# Patient Record
Sex: MALE | Race: WHITE | Employment: OTHER | URBAN - METROPOLITAN AREA
[De-identification: names, ages, dates, MRNs, and addresses within clinical notes are randomized per-mention and may not be internally consistent; named-entity substitution may affect disease eponyms.]

---

## 2017-07-26 ENCOUNTER — OFFICE VISIT (OUTPATIENT)
Dept: FAMILY MEDICINE CLINIC | Facility: CLINIC | Age: 67
End: 2017-07-26

## 2017-07-26 VITALS
WEIGHT: 157 LBS | DIASTOLIC BLOOD PRESSURE: 84 MMHG | RESPIRATION RATE: 16 BRPM | BODY MASS INDEX: 25.23 KG/M2 | TEMPERATURE: 98 F | HEART RATE: 72 BPM | SYSTOLIC BLOOD PRESSURE: 138 MMHG | HEIGHT: 66 IN

## 2017-07-26 DIAGNOSIS — Z00.00 MEDICARE ANNUAL WELLNESS VISIT, SUBSEQUENT: Primary | ICD-10-CM

## 2017-07-26 DIAGNOSIS — Z12.5 PROSTATE CANCER SCREENING: ICD-10-CM

## 2017-07-26 DIAGNOSIS — Z23 NEED FOR VACCINATION: ICD-10-CM

## 2017-07-26 PROCEDURE — G0438 PPPS, INITIAL VISIT: HCPCS | Performed by: FAMILY MEDICINE

## 2017-07-26 NOTE — PROGRESS NOTES
Candance Sinks. is a 74year old male.     CC:  Patient presents with:  Wellness Visit: Medicare Annual Wellness visit per pt      HPI:    Yearly PX   Last lipid: 7/16     Immunization History   Administered            Date(s) Administered     TDAP     hands, he would like a refill from me when he runs out    Vitals: /84   Pulse 72   Temp 98.2 °F (36.8 °C) (Temporal)   Resp 16   Ht 66\"   Wt 157 lb   BMI 25.34 kg/m²    Reviewed by Jackson Downing M.D.     Physical Exam:  GEN: well developed, well nouris

## 2017-08-01 ENCOUNTER — TELEPHONE (OUTPATIENT)
Dept: FAMILY MEDICINE CLINIC | Facility: CLINIC | Age: 67
End: 2017-08-01

## 2017-08-02 ENCOUNTER — NURSE ONLY (OUTPATIENT)
Dept: FAMILY MEDICINE CLINIC | Facility: CLINIC | Age: 67
End: 2017-08-02

## 2017-08-02 DIAGNOSIS — Z00.00 MEDICARE ANNUAL WELLNESS VISIT, SUBSEQUENT: ICD-10-CM

## 2017-08-02 DIAGNOSIS — Z12.5 PROSTATE CANCER SCREENING: ICD-10-CM

## 2017-08-02 LAB
CHOLEST SMN-MCNC: 282 MG/DL (ref ?–200)
COMPLEXED PSA SERPL-MCNC: 1.25 NG/ML (ref 0.01–4)
HDLC SERPL-MCNC: 82 MG/DL (ref 45–?)
HDLC SERPL: 3.44 {RATIO} (ref ?–4.97)
LDLC SERPL CALC-MCNC: 188 MG/DL (ref ?–130)
LDLC SERPL-MCNC: 12 MG/DL (ref 5–40)
NONHDLC SERPL-MCNC: 200 MG/DL (ref ?–130)
TRIGLYCERIDES: 61 MG/DL (ref ?–150)

## 2017-08-02 PROCEDURE — 80061 LIPID PANEL: CPT | Performed by: FAMILY MEDICINE

## 2017-12-15 ENCOUNTER — OFFICE VISIT (OUTPATIENT)
Dept: FAMILY MEDICINE CLINIC | Facility: CLINIC | Age: 67
End: 2017-12-15

## 2017-12-15 ENCOUNTER — HOSPITAL ENCOUNTER (OUTPATIENT)
Dept: GENERAL RADIOLOGY | Age: 67
Discharge: HOME | End: 2017-12-15
Attending: FAMILY MEDICINE
Payer: MEDICARE

## 2017-12-15 VITALS
TEMPERATURE: 98 F | SYSTOLIC BLOOD PRESSURE: 138 MMHG | BODY MASS INDEX: 25.29 KG/M2 | RESPIRATION RATE: 16 BRPM | HEART RATE: 80 BPM | DIASTOLIC BLOOD PRESSURE: 86 MMHG | HEIGHT: 67.5 IN | WEIGHT: 163 LBS

## 2017-12-15 DIAGNOSIS — E78.5 HYPERLIPIDEMIA, UNSPECIFIED HYPERLIPIDEMIA TYPE: ICD-10-CM

## 2017-12-15 DIAGNOSIS — M25.50 MULTIPLE JOINT PAIN: ICD-10-CM

## 2017-12-15 DIAGNOSIS — M25.50 MULTIPLE JOINT PAIN: Primary | ICD-10-CM

## 2017-12-15 DIAGNOSIS — R61 NIGHT SWEATS: ICD-10-CM

## 2017-12-15 DIAGNOSIS — E55.9 VITAMIN D DEFICIENCY: ICD-10-CM

## 2017-12-15 PROCEDURE — 85027 COMPLETE CBC AUTOMATED: CPT | Performed by: FAMILY MEDICINE

## 2017-12-15 PROCEDURE — 84443 ASSAY THYROID STIM HORMONE: CPT | Performed by: FAMILY MEDICINE

## 2017-12-15 PROCEDURE — 87476 LYME DIS DNA AMP PROBE: CPT | Performed by: FAMILY MEDICINE

## 2017-12-15 PROCEDURE — 99214 OFFICE O/P EST MOD 30 MIN: CPT | Performed by: FAMILY MEDICINE

## 2017-12-15 PROCEDURE — 71020 XR CHEST PA + LAT CHEST (CPT=71020): CPT | Performed by: FAMILY MEDICINE

## 2017-12-15 PROCEDURE — 86038 ANTINUCLEAR ANTIBODIES: CPT | Performed by: FAMILY MEDICINE

## 2017-12-15 PROCEDURE — 80053 COMPREHEN METABOLIC PANEL: CPT | Performed by: FAMILY MEDICINE

## 2017-12-15 PROCEDURE — 85652 RBC SED RATE AUTOMATED: CPT | Performed by: FAMILY MEDICINE

## 2017-12-15 PROCEDURE — 82306 VITAMIN D 25 HYDROXY: CPT | Performed by: FAMILY MEDICINE

## 2017-12-15 PROCEDURE — 86431 RHEUMATOID FACTOR QUANT: CPT | Performed by: FAMILY MEDICINE

## 2017-12-15 RX ORDER — MELOXICAM 15 MG/1
15 TABLET ORAL
Qty: 30 TABLET | Refills: 3 | Status: SHIPPED | OUTPATIENT
Start: 2017-12-15 | End: 2019-07-24

## 2017-12-15 NOTE — PROGRESS NOTES
Tiffani Varma. is a 69year old male. CC:  Patient presents with:  Medication Request: refill on diclofenac per pt      HPI:  He has issue with multiple joint pains for many years. It seems that his pains are worse the past 2-3 months.  Pains are l Smokeless tobacco: Never Used                      Comment: CURRENT NON SMOKER  Alcohol use: Yes           0.0 oz/week     Comment: 12 beers per week       ROS:  General: lower energy  Cardiovascular/Pulses: Denies palpitations, CHEST (CPT=71020); Future      3. Vitamin D deficiency   Await results   - VITAMIN D, 25-HYDROXY; Future    4. Hyperlipidemia, unspecified hyperlipidemia type  Await results, will likely need to go back on lipid lowering medication    - LIPID PANEL;  Future

## 2017-12-18 ENCOUNTER — TELEPHONE (OUTPATIENT)
Dept: FAMILY MEDICINE CLINIC | Facility: CLINIC | Age: 67
End: 2017-12-18

## 2017-12-19 ENCOUNTER — NURSE ONLY (OUTPATIENT)
Dept: FAMILY MEDICINE CLINIC | Facility: CLINIC | Age: 67
End: 2017-12-19

## 2017-12-19 DIAGNOSIS — E78.5 HYPERLIPIDEMIA, UNSPECIFIED HYPERLIPIDEMIA TYPE: ICD-10-CM

## 2017-12-19 PROCEDURE — 80061 LIPID PANEL: CPT | Performed by: FAMILY MEDICINE

## 2017-12-19 PROCEDURE — 36415 COLL VENOUS BLD VENIPUNCTURE: CPT | Performed by: FAMILY MEDICINE

## 2017-12-21 ENCOUNTER — TELEPHONE (OUTPATIENT)
Dept: FAMILY MEDICINE CLINIC | Facility: CLINIC | Age: 67
End: 2017-12-21

## 2017-12-21 DIAGNOSIS — E78.5 HYPERLIPIDEMIA, UNSPECIFIED HYPERLIPIDEMIA TYPE: Primary | ICD-10-CM

## 2017-12-21 NOTE — TELEPHONE ENCOUNTER
Agreed. It is a bit perplexing. The T Chol did drop dramatically as did his HDL. I am not sure how to explain this. The most likely explanation is a lab error if he did not drastically cut fats from the diet.  I suppose the best thing to do is to confirm th

## 2017-12-21 NOTE — TELEPHONE ENCOUNTER
Patient wants to discuss his most recent cholesterol results with Dr Misty Uribe. Patient states that it dropped dramatically and he is not sure why as he is not on any cholesterol medication.

## 2017-12-21 NOTE — TELEPHONE ENCOUNTER
Patient was given the information per . Patient states that he has not changed his diet drastically. He states that he believes it is all related to stress.  He is going to get thru the holidays and then about a month after that he will come in an

## 2017-12-21 NOTE — TELEPHONE ENCOUNTER
When patient was given his results yesterday. He could not understand how his Total Cholesterol could have dropped drastically in 4 months. He questioned the results numerous times last night.

## 2018-07-27 ENCOUNTER — OFFICE VISIT (OUTPATIENT)
Dept: FAMILY MEDICINE CLINIC | Facility: CLINIC | Age: 68
End: 2018-07-27
Payer: MEDICARE

## 2018-07-27 VITALS
HEART RATE: 72 BPM | TEMPERATURE: 98 F | HEIGHT: 66 IN | BODY MASS INDEX: 25.23 KG/M2 | DIASTOLIC BLOOD PRESSURE: 85 MMHG | SYSTOLIC BLOOD PRESSURE: 135 MMHG | RESPIRATION RATE: 16 BRPM | WEIGHT: 157 LBS

## 2018-07-27 DIAGNOSIS — E78.5 HYPERLIPIDEMIA, UNSPECIFIED HYPERLIPIDEMIA TYPE: ICD-10-CM

## 2018-07-27 DIAGNOSIS — Z00.00 MEDICARE ANNUAL WELLNESS VISIT, SUBSEQUENT: Primary | ICD-10-CM

## 2018-07-27 DIAGNOSIS — Z12.5 PROSTATE CANCER SCREENING: ICD-10-CM

## 2018-07-27 LAB
ALBUMIN SERPL-MCNC: 3.8 G/DL (ref 3.5–4.8)
ALBUMIN/GLOB SERPL: 1.1 {RATIO} (ref 1–2)
ALP LIVER SERPL-CCNC: 76 U/L (ref 45–117)
ALT SERPL-CCNC: 29 U/L (ref 17–63)
ANION GAP SERPL CALC-SCNC: 8 MMOL/L (ref 0–18)
AST SERPL-CCNC: 25 U/L (ref 15–41)
BILIRUB SERPL-MCNC: 0.5 MG/DL (ref 0.1–2)
BUN BLD-MCNC: 19 MG/DL (ref 8–20)
BUN/CREAT SERPL: 19 (ref 10–20)
CALCIUM BLD-MCNC: 8.9 MG/DL (ref 8.3–10.3)
CHLORIDE SERPL-SCNC: 104 MMOL/L (ref 101–111)
CHOLEST SMN-MCNC: 257 MG/DL (ref ?–200)
CO2 SERPL-SCNC: 26 MMOL/L (ref 22–32)
COMPLEXED PSA SERPL-MCNC: 1.21 NG/ML (ref 0.01–4)
CREAT BLD-MCNC: 1 MG/DL (ref 0.7–1.3)
ERYTHROCYTE [DISTWIDTH] IN BLOOD BY AUTOMATED COUNT: 13.5 % (ref 11.5–16)
GLOBULIN PLAS-MCNC: 3.6 G/DL (ref 2.5–3.7)
GLUCOSE BLD-MCNC: 82 MG/DL (ref 70–99)
HCT VFR BLD AUTO: 49.8 % (ref 37–53)
HDLC SERPL-MCNC: 80 MG/DL (ref 40–59)
HGB BLD-MCNC: 16.5 G/DL (ref 13–17)
LDLC SERPL CALC-MCNC: 164 MG/DL (ref ?–100)
M PROTEIN MFR SERPL ELPH: 7.4 G/DL (ref 6.1–8.3)
MCH RBC QN AUTO: 30.8 PG (ref 27–33.2)
MCHC RBC AUTO-ENTMCNC: 33.1 G/DL (ref 31–37)
MCV RBC AUTO: 92.9 FL (ref 80–99)
NONHDLC SERPL-MCNC: 177 MG/DL (ref ?–130)
OSMOLALITY SERPL CALC.SUM OF ELEC: 287 MOSM/KG (ref 275–295)
PLATELET # BLD AUTO: 217 10(3)UL (ref 150–450)
POTASSIUM SERPL-SCNC: 4.1 MMOL/L (ref 3.6–5.1)
RBC # BLD AUTO: 5.36 X10(6)UL (ref 3.8–5.8)
RED CELL DISTRIBUTION WIDTH-SD: 45.8 FL (ref 35.1–46.3)
SODIUM SERPL-SCNC: 138 MMOL/L (ref 136–144)
TRIGL SERPL-MCNC: 66 MG/DL (ref 30–149)
TSI SER-ACNC: 0.98 MIU/ML (ref 0.35–5.5)
VLDLC SERPL CALC-MCNC: 13 MG/DL (ref 0–30)
WBC # BLD AUTO: 6.1 X10(3) UL (ref 4–13)

## 2018-07-27 PROCEDURE — 85027 COMPLETE CBC AUTOMATED: CPT | Performed by: FAMILY MEDICINE

## 2018-07-27 PROCEDURE — 84443 ASSAY THYROID STIM HORMONE: CPT | Performed by: FAMILY MEDICINE

## 2018-07-27 PROCEDURE — 80053 COMPREHEN METABOLIC PANEL: CPT | Performed by: FAMILY MEDICINE

## 2018-07-27 PROCEDURE — 36415 COLL VENOUS BLD VENIPUNCTURE: CPT | Performed by: FAMILY MEDICINE

## 2018-07-27 PROCEDURE — G0439 PPPS, SUBSEQ VISIT: HCPCS | Performed by: FAMILY MEDICINE

## 2018-07-27 PROCEDURE — 80061 LIPID PANEL: CPT | Performed by: FAMILY MEDICINE

## 2018-07-27 NOTE — PROGRESS NOTES
Donny Hauser. is a 69year old male. CC:  No chief complaint on file.       HPI:  Yearly PX  Last lipid: 12/17  Last PSA: 8/17    Component      Latest Ref Rng & Units 12/19/2017 8/2/2017   CHOLESTEROL, TOTAL      <200 mg/dL 206 (H)    Triglycerid No    Difficulty dressing or bathing?: No    Problems with daily activities? : No    Memory Problems?: No      Fall/Risk Assessment          Have you fallen in the last 12 months?: 0-No                                        Fall/Risk Scorin          D Relation Status Comments   Fa  at age 69 CVA   Mo  at age 64 DEMENTIA      Smoking status: Never Smoker                                                              Smokeless tobacco: Never Used                      Comment: CURRENT Future  - TSH W REFLEX TO FREE T4; Future  - LIPID PANEL; Future  - PSA SCREEN; Future  - VENIPUNCTURE    2. Hyperlipidemia, unspecified hyperlipidemia type  Await results   - LIPID PANEL; Future  - VENIPUNCTURE    3.  Prostate cancer screening  Await resul

## 2018-12-30 ENCOUNTER — HOSPITAL ENCOUNTER (OUTPATIENT)
Age: 68
Discharge: HOME | End: 2018-12-30
Payer: MEDICARE

## 2018-12-30 VITALS
WEIGHT: 158 LBS | BODY MASS INDEX: 26 KG/M2 | RESPIRATION RATE: 16 BRPM | DIASTOLIC BLOOD PRESSURE: 88 MMHG | HEART RATE: 75 BPM | SYSTOLIC BLOOD PRESSURE: 135 MMHG | TEMPERATURE: 100 F

## 2018-12-30 DIAGNOSIS — B02.9 HERPES ZOSTER WITHOUT COMPLICATION: Primary | ICD-10-CM

## 2018-12-30 PROCEDURE — 99204 OFFICE O/P NEW MOD 45 MIN: CPT | Performed by: NURSE PRACTITIONER

## 2018-12-30 PROCEDURE — 99213 OFFICE O/P EST LOW 20 MIN: CPT

## 2018-12-30 PROCEDURE — 99204 OFFICE O/P NEW MOD 45 MIN: CPT

## 2018-12-30 RX ORDER — NAPROXEN 500 MG/1
500 TABLET ORAL 2 TIMES DAILY WITH MEALS
COMMUNITY
End: 2019-07-24

## 2018-12-30 RX ORDER — HYDROCODONE BITARTRATE AND ACETAMINOPHEN 5; 325 MG/1; MG/1
1 TABLET ORAL EVERY 6 HOURS PRN
Qty: 10 TABLET | Refills: 0 | Status: SHIPPED | OUTPATIENT
Start: 2018-12-30 | End: 2019-01-07

## 2018-12-30 RX ORDER — VALACYCLOVIR HYDROCHLORIDE 1 G/1
1 TABLET, FILM COATED ORAL 3 TIMES DAILY
Qty: 21 TABLET | Refills: 0 | Status: SHIPPED | OUTPATIENT
Start: 2018-12-30 | End: 2019-01-06

## 2018-12-30 RX ORDER — METHYLPREDNISOLONE 4 MG/1
TABLET ORAL
Qty: 1 PACKAGE | Refills: 0 | Status: SHIPPED | OUTPATIENT
Start: 2018-12-30 | End: 2019-01-07

## 2018-12-30 NOTE — ED PROVIDER NOTES
Patient Seen in: [address] [facility] [place]    History   Patient presents with:  Rash Skin Problem (integumentary)    Stated Complaint: Neck rash x 1days    38-year-old male presents today with a rash to the right side of the neck extending down to Resp 16   Temp 99.8 °F (37.7 °C)   Temp src Temporal   SpO2    O2 Device        Current:/88   Pulse 75   Temp 99.8 °F (37.7 °C) (Temporal)   Resp 16   Wt 71.7 kg   BMI 25.50 kg/m²         Physical Exam   Constitutional: He appears well-developed an Oral Tab  Take 1 tablet (1,000 mg total) by mouth 3 (three) times daily for 7 days.   Qty: 21 tablet Refills: 0    methylPREDNISolone (MEDROL) 4 MG Oral Tablet Therapy Pack  Dosepack: take as directed  Qty: 1 Package Refills: 0    HYDROcodone-acetaminophen

## 2018-12-30 NOTE — ED INITIAL ASSESSMENT (HPI)
Rash on right side of his neck, spreading to right upper chest, no shortness of breath, + itching, rash occurred after new shampoo, rash started with a fine line around his neck before spreading.

## 2019-01-07 ENCOUNTER — OFFICE VISIT (OUTPATIENT)
Dept: FAMILY MEDICINE CLINIC | Facility: CLINIC | Age: 69
End: 2019-01-07
Payer: MEDICARE

## 2019-01-07 VITALS
BODY MASS INDEX: 25 KG/M2 | TEMPERATURE: 98 F | RESPIRATION RATE: 14 BRPM | SYSTOLIC BLOOD PRESSURE: 120 MMHG | HEART RATE: 70 BPM | DIASTOLIC BLOOD PRESSURE: 84 MMHG | WEIGHT: 154.38 LBS

## 2019-01-07 DIAGNOSIS — B02.9 HERPES ZOSTER WITHOUT COMPLICATION: Primary | ICD-10-CM

## 2019-01-07 PROCEDURE — 99213 OFFICE O/P EST LOW 20 MIN: CPT | Performed by: FAMILY MEDICINE

## 2019-01-07 NOTE — PROGRESS NOTES
Sheryle Familia. is a 77year old male. CC:  Patient presents with:  Shingles      HPI:  F/u UC visit 12/30/18 for shingles. He has taken Valtrex and Prednisone as directed. He did not need the Norco that was prescribed.  He did note pain in the 2nd energy level stable  GI: Denies abdominal pain    Vitals: /84   Pulse 70   Temp 98.3 °F (36.8 °C) (Temporal)   Resp 14   Wt 154 lb 6.4 oz   BMI 24.92 kg/m²    Reviewed by Kendall Yeung M.D.     Physical Exam:  GEN: well developed, well nourished, in no

## 2019-07-22 ENCOUNTER — TELEPHONE (OUTPATIENT)
Dept: FAMILY MEDICINE CLINIC | Facility: CLINIC | Age: 69
End: 2019-07-22

## 2019-07-22 NOTE — TELEPHONE ENCOUNTER
Received OV notes from ROGER Hough. Reviewed by Dr Linda Santo. Patient also brought in written note from Dr Johnny Leonard advising patient to see PCP for Medrol dose pack. Per Dr Linda Santo patient needs an appt first.     Patient advised. Verbalized understanding.

## 2019-07-24 ENCOUNTER — OFFICE VISIT (OUTPATIENT)
Dept: FAMILY MEDICINE CLINIC | Facility: CLINIC | Age: 69
End: 2019-07-24
Payer: MEDICARE

## 2019-07-24 VITALS
SYSTOLIC BLOOD PRESSURE: 142 MMHG | HEIGHT: 66.25 IN | DIASTOLIC BLOOD PRESSURE: 92 MMHG | RESPIRATION RATE: 14 BRPM | BODY MASS INDEX: 25.39 KG/M2 | TEMPERATURE: 98 F | WEIGHT: 158 LBS | OXYGEN SATURATION: 98 % | HEART RATE: 80 BPM

## 2019-07-24 DIAGNOSIS — M54.10 RADICULAR PAIN OF LEFT LOWER EXTREMITY: Primary | ICD-10-CM

## 2019-07-24 PROCEDURE — 99214 OFFICE O/P EST MOD 30 MIN: CPT | Performed by: FAMILY MEDICINE

## 2019-07-24 RX ORDER — PREDNISONE 20 MG/1
TABLET ORAL
Qty: 18 TABLET | Refills: 0 | Status: SHIPPED | OUTPATIENT
Start: 2019-07-24 | End: 2019-08-02

## 2019-07-24 NOTE — PROGRESS NOTES
Serena Rosas. is a 77year old male.     CC:  Patient presents with:  Leg Pain: per pt- from left side down to leg      HPI:  Chief Complaint: Left buttock Pain  Duration:  3 Week(s)  Severity: severe  Cause/ Description of Injury: started after sitt lymphadenopathy, thyromegaly or masses  CAR: S1, S2 normal, RRR; no S3, no S4; no click; murmur negative  PULM: clear to auscultation B, no accessory muscle use  GI: not examined  PSYCH: alert and oriented x 3; affect appropriate  SKIN: no rashes, no suspi

## 2019-07-29 ENCOUNTER — TELEPHONE (OUTPATIENT)
Dept: FAMILY MEDICINE CLINIC | Facility: CLINIC | Age: 69
End: 2019-07-29

## 2019-07-29 NOTE — TELEPHONE ENCOUNTER
DR BROWN CALLED AND WANTED TO UPDATE DR MANIRQUE WITH SOME INFO BEFORE PT WAS SEEN.       PLEASE CALL CELL [phone]    KIRK MATHEW OUT OF OFFICE UNTIL TOMORROW  V/U    THANK YOU

## 2019-07-29 NOTE — TELEPHONE ENCOUNTER
Routing to PCP.  Patient is being seen tomorrow at [address]   Date Time Provider Sandro Guevara   7/30/2019  9:00 AM Ty Simmons MD [facility] [place] Ferrera

## 2019-07-30 ENCOUNTER — OFFICE VISIT (OUTPATIENT)
Dept: FAMILY MEDICINE CLINIC | Facility: CLINIC | Age: 69
End: 2019-07-30
Payer: MEDICARE

## 2019-07-30 VITALS
BODY MASS INDEX: 24.62 KG/M2 | RESPIRATION RATE: 16 BRPM | SYSTOLIC BLOOD PRESSURE: 142 MMHG | WEIGHT: 155 LBS | DIASTOLIC BLOOD PRESSURE: 90 MMHG | TEMPERATURE: 99 F | HEART RATE: 76 BPM | HEIGHT: 66.5 IN

## 2019-07-30 DIAGNOSIS — Z12.5 PROSTATE CANCER SCREENING: ICD-10-CM

## 2019-07-30 DIAGNOSIS — M54.10 RADICULAR PAIN OF LEFT LOWER EXTREMITY: ICD-10-CM

## 2019-07-30 DIAGNOSIS — E78.5 HYPERLIPIDEMIA, UNSPECIFIED HYPERLIPIDEMIA TYPE: ICD-10-CM

## 2019-07-30 DIAGNOSIS — R03.0 ELEVATED BLOOD PRESSURE READING: ICD-10-CM

## 2019-07-30 DIAGNOSIS — Z00.00 MEDICARE ANNUAL WELLNESS VISIT, SUBSEQUENT: Primary | ICD-10-CM

## 2019-07-30 LAB
ALBUMIN SERPL-MCNC: 3.9 G/DL (ref 3.4–5)
ALBUMIN/GLOB SERPL: 1 {RATIO} (ref 1–2)
ALP LIVER SERPL-CCNC: 74 U/L (ref 45–117)
ALT SERPL-CCNC: 28 U/L (ref 16–61)
ANION GAP SERPL CALC-SCNC: 3 MMOL/L (ref 0–18)
AST SERPL-CCNC: 23 U/L (ref 15–37)
BILIRUB SERPL-MCNC: 0.7 MG/DL (ref 0.1–2)
BUN BLD-MCNC: 21 MG/DL (ref 7–18)
BUN/CREAT SERPL: 18.6 (ref 10–20)
CALCIUM BLD-MCNC: 9.9 MG/DL (ref 8.5–10.1)
CHLORIDE SERPL-SCNC: 101 MMOL/L (ref 98–112)
CHOLEST SMN-MCNC: 256 MG/DL (ref ?–200)
CO2 SERPL-SCNC: 33 MMOL/L (ref 21–32)
COMPLEXED PSA SERPL-MCNC: 5.18 NG/ML (ref ?–4)
CREAT BLD-MCNC: 1.13 MG/DL (ref 0.7–1.3)
DEPRECATED RDW RBC AUTO: 49.3 FL (ref 35.1–46.3)
ERYTHROCYTE [DISTWIDTH] IN BLOOD BY AUTOMATED COUNT: 14.8 % (ref 11–15)
GLOBULIN PLAS-MCNC: 4.1 G/DL (ref 2.8–4.4)
GLUCOSE BLD-MCNC: 89 MG/DL (ref 70–99)
HCT VFR BLD AUTO: 53.1 % (ref 39–53)
HDLC SERPL-MCNC: 91 MG/DL (ref 40–59)
HGB BLD-MCNC: 17.3 G/DL (ref 13–17.5)
LDLC SERPL CALC-MCNC: 139 MG/DL (ref ?–100)
M PROTEIN MFR SERPL ELPH: 8 G/DL (ref 6.4–8.2)
MCH RBC QN AUTO: 30.1 PG (ref 26–34)
MCHC RBC AUTO-ENTMCNC: 32.6 G/DL (ref 31–37)
MCV RBC AUTO: 92.3 FL (ref 80–100)
NONHDLC SERPL-MCNC: 165 MG/DL (ref ?–130)
OSMOLALITY SERPL CALC.SUM OF ELEC: 286 MOSM/KG (ref 275–295)
PLATELET # BLD AUTO: 259 10(3)UL (ref 150–450)
POTASSIUM SERPL-SCNC: 4 MMOL/L (ref 3.5–5.1)
RBC # BLD AUTO: 5.75 X10(6)UL (ref 3.8–5.8)
SODIUM SERPL-SCNC: 137 MMOL/L (ref 136–145)
TRIGL SERPL-MCNC: 129 MG/DL (ref 30–149)
TSI SER-ACNC: 1.17 MIU/ML (ref 0.36–3.74)
VLDLC SERPL CALC-MCNC: 26 MG/DL (ref 0–30)
WBC # BLD AUTO: 9.4 X10(3) UL (ref 4–11)

## 2019-07-30 PROCEDURE — 85027 COMPLETE CBC AUTOMATED: CPT | Performed by: FAMILY MEDICINE

## 2019-07-30 PROCEDURE — G0439 PPPS, SUBSEQ VISIT: HCPCS | Performed by: FAMILY MEDICINE

## 2019-07-30 PROCEDURE — 80053 COMPREHEN METABOLIC PANEL: CPT | Performed by: FAMILY MEDICINE

## 2019-07-30 PROCEDURE — 84443 ASSAY THYROID STIM HORMONE: CPT | Performed by: FAMILY MEDICINE

## 2019-07-30 PROCEDURE — 80061 LIPID PANEL: CPT | Performed by: FAMILY MEDICINE

## 2019-07-30 RX ORDER — ACETAMINOPHEN AND CODEINE PHOSPHATE 300; 30 MG/1; MG/1
1 TABLET ORAL EVERY 4 HOURS PRN
Qty: 30 TABLET | Refills: 0 | Status: SHIPPED
Start: 2019-07-30 | End: 2019-08-07

## 2019-07-30 NOTE — PROGRESS NOTES
Zara Maryland. is a 77year old male.     CC:  Patient presents with:  Wellness Visit: Medicare Wellness Visit per pt      HPI:  Yearly PX  Last lipid:  Lab Results   Component Value Date    CHOLEST 257 (H) 07/27/2018    TRIG 66 07/27/2018    HDL 80 ( Able without help    Preparing your meals: Able without help    Managing money/bills: Able without help    Taking medications as prescribed: Able without help    Are you able to afford your medications?: Yes    Hearing Problems?: No     Functional Status PUILMONARY NODULE   • Nephrolithiasis 10/02/2009      Past Surgical History:   Procedure Laterality Date   • INGUINAL HERNIORRHAPHY      BILAT W/REVISIONS      Family History   Problem Relation Age of Onset   • Other (PSYCHIATRIC PROBLEMS [Other]) Mother greater trochanter. NEURO: slight decrease in the R Achilles DTR, + L SLR    ASSESSMENT AND PLAN    1.  Medicare annual wellness visit, subsequent  Colonoscopy in 2024  He defers Pneumonia vaccines  He will get Shingles vaccine at local pharmacy  Await res needed for Pain. No follow-ups on file.       Authorized by Eb Paez M.D.

## 2019-08-03 ENCOUNTER — HOSPITAL ENCOUNTER (OUTPATIENT)
Dept: MRI IMAGING | Age: 69
Discharge: HOME | End: 2019-08-03
Attending: FAMILY MEDICINE
Payer: MEDICARE

## 2019-08-03 DIAGNOSIS — M54.10 RADICULAR PAIN OF LEFT LOWER EXTREMITY: ICD-10-CM

## 2019-08-03 PROCEDURE — 72148 MRI LUMBAR SPINE W/O DYE: CPT | Performed by: FAMILY MEDICINE

## 2019-08-06 ENCOUNTER — HOSPITAL ENCOUNTER (OUTPATIENT)
Dept: GENERAL RADIOLOGY | Age: 69
Discharge: HOME | End: 2019-08-06
Attending: FAMILY MEDICINE
Payer: MEDICARE

## 2019-08-06 DIAGNOSIS — R97.20 ELEVATED PSA: ICD-10-CM

## 2019-08-06 DIAGNOSIS — M25.552 LEFT HIP PAIN: ICD-10-CM

## 2019-08-06 DIAGNOSIS — M25.552 LEFT HIP PAIN: Primary | ICD-10-CM

## 2019-08-06 PROCEDURE — 73502 X-RAY EXAM HIP UNI 2-3 VIEWS: CPT | Performed by: FAMILY MEDICINE

## 2019-08-07 ENCOUNTER — TELEPHONE (OUTPATIENT)
Dept: FAMILY MEDICINE CLINIC | Facility: CLINIC | Age: 69
End: 2019-08-07

## 2019-08-07 RX ORDER — ACETAMINOPHEN AND CODEINE PHOSPHATE 300; 30 MG/1; MG/1
1 TABLET ORAL EVERY 4 HOURS PRN
Qty: 30 TABLET | Refills: 0 | Status: SHIPPED | OUTPATIENT
Start: 2019-08-07 | End: 2019-09-17

## 2019-08-07 RX ORDER — CYCLOBENZAPRINE HCL 10 MG
10 TABLET ORAL 3 TIMES DAILY PRN
Qty: 30 TABLET | Refills: 0 | Status: SHIPPED | OUTPATIENT
Start: 2019-08-07 | End: 2019-08-27

## 2019-08-07 NOTE — TELEPHONE ENCOUNTER
Patient advised of the information per .  [email or web address]

## 2019-08-07 NOTE — TELEPHONE ENCOUNTER
Please let patient or caregiver know or leave message when the T3 has been sent. I also sent Flexeril to his pharmacy. If that does not help then we need to see the Ortho Spine specialist.    The prostate blood elevation is not caused by the hip issue.

## 2019-08-07 NOTE — TELEPHONE ENCOUNTER
----- Message from Jemima Stauffer MD sent at 8/7/2019  7:31 AM CDT -----  Please let patient or caregiver know or leave message that his L hip xray shows mild arthritic change. The xray was able to look at his entire pelvis as well as the R hip.  There are

## 2019-08-07 NOTE — TELEPHONE ENCOUNTER
Patient advised of the x-ray results. Patient is wonderin. Can he have a refill on his Tylenol with Codeine? It is helping with the pain. 2. Do you think a muscle relaxer would help? 3. Is the PSA up do the hip issues?

## 2019-08-30 ENCOUNTER — TELEPHONE (OUTPATIENT)
Dept: FAMILY MEDICINE CLINIC | Facility: CLINIC | Age: 69
End: 2019-08-30

## 2019-08-30 RX ORDER — PANTOPRAZOLE SODIUM 40 MG/1
40 TABLET, DELAYED RELEASE ORAL
Qty: 30 TABLET | Refills: 0 | Status: SHIPPED | OUTPATIENT
Start: 2019-08-30 | End: 2019-09-19

## 2019-08-30 NOTE — TELEPHONE ENCOUNTER
Patient advised of the information per . Appointment made. Prescription sent to the pharmacy.    Patient will get the information for the GI specialist when he is in the office on Saturday

## 2019-08-30 NOTE — TELEPHONE ENCOUNTER
I do. Can I see him tomorrow? I see that he has been given recent scripts for Amoxil and Motrin----I think from his Dentist?? The black tar like stools is concerning for a GI bleed--usually from the upper GI tract, like the stomach.  The Motrin type produ

## 2019-08-30 NOTE — TELEPHONE ENCOUNTER
Pt said for the 5th day in a row his Bowel movements are the color of tar. No stomach pains, no vomiting, no nausea. No trauma. Pt does not feel bad of any sorts. He wants to know if TJ wants to see him.    Please return call to [phone]

## 2019-08-31 ENCOUNTER — OFFICE VISIT (OUTPATIENT)
Dept: FAMILY MEDICINE CLINIC | Facility: CLINIC | Age: 69
End: 2019-08-31
Payer: MEDICARE

## 2019-08-31 VITALS
WEIGHT: 157.13 LBS | TEMPERATURE: 99 F | SYSTOLIC BLOOD PRESSURE: 136 MMHG | DIASTOLIC BLOOD PRESSURE: 72 MMHG | BODY MASS INDEX: 25 KG/M2 | HEART RATE: 84 BPM | OXYGEN SATURATION: 98 % | RESPIRATION RATE: 16 BRPM

## 2019-08-31 DIAGNOSIS — K92.1 MELENA: Primary | ICD-10-CM

## 2019-08-31 DIAGNOSIS — R97.20 ELEVATED PSA: ICD-10-CM

## 2019-08-31 LAB
DEPRECATED RDW RBC AUTO: 48.4 FL (ref 35.1–46.3)
ERYTHROCYTE [DISTWIDTH] IN BLOOD BY AUTOMATED COUNT: 14.4 % (ref 11–15)
HCT VFR BLD AUTO: 36.2 % (ref 39–53)
HGB BLD-MCNC: 11.9 G/DL (ref 13–17.5)
MCH RBC QN AUTO: 30.6 PG (ref 26–34)
MCHC RBC AUTO-ENTMCNC: 32.9 G/DL (ref 31–37)
MCV RBC AUTO: 93.1 FL (ref 80–100)
PLATELET # BLD AUTO: 229 10(3)UL (ref 150–450)
PSA SERPL-MCNC: 1.87 NG/ML (ref ?–4)
RBC # BLD AUTO: 3.89 X10(6)UL (ref 3.8–5.8)
WBC # BLD AUTO: 8.6 X10(3) UL (ref 4–11)

## 2019-08-31 PROCEDURE — 84153 ASSAY OF PSA TOTAL: CPT | Performed by: FAMILY MEDICINE

## 2019-08-31 PROCEDURE — 99214 OFFICE O/P EST MOD 30 MIN: CPT | Performed by: FAMILY MEDICINE

## 2019-08-31 PROCEDURE — 85027 COMPLETE CBC AUTOMATED: CPT | Performed by: FAMILY MEDICINE

## 2019-08-31 NOTE — PROGRESS NOTES
Tiffani Varma. is a 77year old male. CC:  Patient presents with: Other: black stools for one week per patient       HPI:  Has note black stools for about one week. No abd pain. No NSAIDs but has been taking ASA daily for hip pain. No wt loss.  He (36.9 °C) (Temporal)   Resp 16   Wt 157 lb 2 oz   SpO2 98%   BMI 24.98 kg/m²    Reviewed by Maryuri Dyer M.D.     Physical Exam:  GEN: well developed, well nourished, in no apparent distress  EYE: B conjunctiva and lids normal  HENT: normocephalic; normal no see ambulance record

## 2019-09-10 PROBLEM — F10.10 ALCOHOL ABUSE: Status: ACTIVE | Noted: 2019-09-10

## 2019-09-10 PROBLEM — K92.1 BLACK TARRY STOOLS: Status: ACTIVE | Noted: 2019-09-10

## 2019-09-10 PROBLEM — K21.9 GERD WITHOUT ESOPHAGITIS: Status: ACTIVE | Noted: 2019-09-10

## 2019-09-10 PROCEDURE — 86706 HEP B SURFACE ANTIBODY: CPT | Performed by: INTERNAL MEDICINE

## 2019-09-10 PROCEDURE — 86708 HEPATITIS A ANTIBODY: CPT | Performed by: INTERNAL MEDICINE

## 2019-09-10 PROCEDURE — 86704 HEP B CORE ANTIBODY TOTAL: CPT | Performed by: INTERNAL MEDICINE

## 2019-09-10 PROCEDURE — 80074 ACUTE HEPATITIS PANEL: CPT | Performed by: INTERNAL MEDICINE

## 2019-09-10 PROCEDURE — 87340 HEPATITIS B SURFACE AG IA: CPT | Performed by: INTERNAL MEDICINE

## 2019-09-19 PROCEDURE — 88305 TISSUE EXAM BY PATHOLOGIST: CPT | Performed by: INTERNAL MEDICINE

## 2019-12-17 ENCOUNTER — TELEPHONE (OUTPATIENT)
Dept: FAMILY MEDICINE CLINIC | Facility: CLINIC | Age: 69
End: 2019-12-17

## 2019-12-17 ENCOUNTER — NURSE ONLY (OUTPATIENT)
Dept: FAMILY MEDICINE CLINIC | Facility: CLINIC | Age: 69
End: 2019-12-17
Payer: MEDICARE

## 2019-12-17 DIAGNOSIS — R97.20 ELEVATED PSA: ICD-10-CM

## 2019-12-17 PROCEDURE — 84153 ASSAY OF PSA TOTAL: CPT | Performed by: FAMILY MEDICINE

## 2019-12-17 NOTE — TELEPHONE ENCOUNTER
Patient got a letter that he is due for a PSA he is coming in this afternoon to do that just [facility]

## 2019-12-17 NOTE — PROGRESS NOTES
Mint tube drawn from left arm with 21g butterfly needle x1. Pt piyush well.  Pt left the clinic in stable condition

## 2020-01-30 ENCOUNTER — TELEPHONE (OUTPATIENT)
Dept: FAMILY MEDICINE CLINIC | Facility: CLINIC | Age: 70
End: 2020-01-30

## 2020-01-30 NOTE — TELEPHONE ENCOUNTER
Patient advised of the information per Dr. Yany Pickard. Patient states that his hand pain has gotten worse. He would like to have it reevaluated.

## 2020-01-30 NOTE — TELEPHONE ENCOUNTER
Patient got an automated call that he is due for a follow up. He is coming in on Monday for hand pain. Can we see if he is due for anything else while he is here? Call him back if you have any questions.

## 2020-02-05 ENCOUNTER — OFFICE VISIT (OUTPATIENT)
Dept: FAMILY MEDICINE CLINIC | Facility: CLINIC | Age: 70
End: 2020-02-05
Payer: MEDICARE

## 2020-02-05 ENCOUNTER — HOSPITAL ENCOUNTER (OUTPATIENT)
Dept: GENERAL RADIOLOGY | Age: 70
Discharge: HOME | End: 2020-02-05
Attending: FAMILY MEDICINE
Payer: MEDICARE

## 2020-02-05 VITALS
BODY MASS INDEX: 26.24 KG/M2 | HEIGHT: 67 IN | TEMPERATURE: 99 F | WEIGHT: 167.19 LBS | DIASTOLIC BLOOD PRESSURE: 88 MMHG | RESPIRATION RATE: 16 BRPM | HEART RATE: 68 BPM | SYSTOLIC BLOOD PRESSURE: 139 MMHG | OXYGEN SATURATION: 99 %

## 2020-02-05 DIAGNOSIS — M89.349: ICD-10-CM

## 2020-02-05 DIAGNOSIS — K44.9 HIATAL HERNIA: ICD-10-CM

## 2020-02-05 DIAGNOSIS — M79.641 RIGHT HAND PAIN: ICD-10-CM

## 2020-02-05 DIAGNOSIS — M79.641 RIGHT HAND PAIN: Primary | ICD-10-CM

## 2020-02-05 DIAGNOSIS — K21.9 GERD WITHOUT ESOPHAGITIS: ICD-10-CM

## 2020-02-05 LAB — RHEUMATOID FACT SERPL-ACNC: <10 IU/ML (ref ?–15)

## 2020-02-05 PROCEDURE — 73130 X-RAY EXAM OF HAND: CPT | Performed by: FAMILY MEDICINE

## 2020-02-05 PROCEDURE — 99214 OFFICE O/P EST MOD 30 MIN: CPT | Performed by: FAMILY MEDICINE

## 2020-02-05 PROCEDURE — 86200 CCP ANTIBODY: CPT | Performed by: FAMILY MEDICINE

## 2020-02-05 PROCEDURE — 86431 RHEUMATOID FACTOR QUANT: CPT | Performed by: FAMILY MEDICINE

## 2020-02-05 RX ORDER — CELECOXIB 200 MG/1
200 CAPSULE ORAL
Qty: 30 CAPSULE | Refills: 0 | Status: SHIPPED | OUTPATIENT
Start: 2020-02-05 | End: 2021-01-05

## 2020-02-05 RX ORDER — PANTOPRAZOLE SODIUM 40 MG/1
40 TABLET, DELAYED RELEASE ORAL
COMMUNITY
Start: 2020-02-05 | End: 2021-01-05

## 2020-02-05 NOTE — PROGRESS NOTES
Soraida Godinez. is a 68year old male. CC:  Patient presents with:  Hand Pain: per pt- right      HPI:  B hand pains. Occurring for many years. It has become worse the past month. He has taken left over Diclofenac helps the pain.  He had normal B ha [Other]) Mother    • Other (CROHN'S [Other]) Father    • Other (NEPHROLITIASIS [Other]) Father       Family Status   Relation Status   • Fa  at age 71        CVA   • Mo  at age 66        DEMENTIA      Social History    Tobacco Use      Smok HAND (MIN 3 VIEWS), RIGHT (CPT=73130); Future  - RHEUMATOID ARTHRITIS FACTOR  - CYCLIC CITRULLINATE PEP. IGG    2. Hypertrophy of bone of hand  As above   - RHEUMATOID ARTHRITIS FACTOR; Future  - CYCLIC CITRULLINATE PEP.  IGG; Future  - XR HAND (MIN 3 VIEWS

## 2020-02-07 LAB — CCP IGG SERPL-ACNC: 0.5 U/ML (ref 0–6.9)

## 2021-01-05 ENCOUNTER — OFFICE VISIT (OUTPATIENT)
Dept: FAMILY MEDICINE CLINIC | Facility: CLINIC | Age: 71
End: 2021-01-05
Payer: MEDICARE

## 2021-01-05 VITALS
RESPIRATION RATE: 16 BRPM | TEMPERATURE: 98 F | HEIGHT: 66 IN | WEIGHT: 163 LBS | OXYGEN SATURATION: 97 % | DIASTOLIC BLOOD PRESSURE: 88 MMHG | SYSTOLIC BLOOD PRESSURE: 132 MMHG | HEART RATE: 70 BPM | BODY MASS INDEX: 26.2 KG/M2

## 2021-01-05 DIAGNOSIS — Z00.00 MEDICARE ANNUAL WELLNESS VISIT, SUBSEQUENT: Primary | ICD-10-CM

## 2021-01-05 DIAGNOSIS — Z12.5 PROSTATE CANCER SCREENING: ICD-10-CM

## 2021-01-05 DIAGNOSIS — E78.5 HYPERLIPIDEMIA, UNSPECIFIED HYPERLIPIDEMIA TYPE: ICD-10-CM

## 2021-01-05 DIAGNOSIS — K21.9 GERD WITHOUT ESOPHAGITIS: ICD-10-CM

## 2021-01-05 LAB
ALT SERPL-CCNC: 30 U/L
ANION GAP SERPL CALC-SCNC: 9 MMOL/L (ref 0–18)
AST SERPL-CCNC: 26 U/L (ref 15–37)
BUN BLD-MCNC: 17 MG/DL (ref 7–18)
BUN/CREAT SERPL: 16.3 (ref 10–20)
CALCIUM BLD-MCNC: 9.3 MG/DL (ref 8.5–10.1)
CHLORIDE SERPL-SCNC: 104 MMOL/L (ref 98–112)
CHOLEST SMN-MCNC: 293 MG/DL (ref ?–200)
CO2 SERPL-SCNC: 25 MMOL/L (ref 21–32)
COMPLEXED PSA SERPL-MCNC: 1.57 NG/ML (ref ?–4)
CREAT BLD-MCNC: 1.04 MG/DL
DEPRECATED RDW RBC AUTO: 51.6 FL (ref 35.1–46.3)
ERYTHROCYTE [DISTWIDTH] IN BLOOD BY AUTOMATED COUNT: 18.6 % (ref 11–15)
GLUCOSE BLD-MCNC: 85 MG/DL (ref 70–99)
HCT VFR BLD AUTO: 47.6 %
HDLC SERPL-MCNC: 79 MG/DL (ref 40–59)
HGB BLD-MCNC: 15.5 G/DL
LDLC SERPL CALC-MCNC: 199 MG/DL (ref ?–100)
MCH RBC QN AUTO: 26.4 PG (ref 26–34)
MCHC RBC AUTO-ENTMCNC: 32.6 G/DL (ref 31–37)
MCV RBC AUTO: 81.1 FL
NONHDLC SERPL-MCNC: 214 MG/DL (ref ?–130)
OSMOLALITY SERPL CALC.SUM OF ELEC: 287 MOSM/KG (ref 275–295)
PATIENT FASTING Y/N/NP: YES
PATIENT FASTING Y/N/NP: YES
PLATELET # BLD AUTO: 257 10(3)UL (ref 150–450)
POTASSIUM SERPL-SCNC: 3.9 MMOL/L (ref 3.5–5.1)
RBC # BLD AUTO: 5.87 X10(6)UL
SODIUM SERPL-SCNC: 138 MMOL/L (ref 136–145)
TRIGL SERPL-MCNC: 73 MG/DL (ref 30–149)
TSI SER-ACNC: 0.99 MIU/ML (ref 0.36–3.74)
VLDLC SERPL CALC-MCNC: 15 MG/DL (ref 0–30)
WBC # BLD AUTO: 6.7 X10(3) UL (ref 4–11)

## 2021-01-05 PROCEDURE — 80061 LIPID PANEL: CPT | Performed by: FAMILY MEDICINE

## 2021-01-05 PROCEDURE — 80048 BASIC METABOLIC PNL TOTAL CA: CPT | Performed by: FAMILY MEDICINE

## 2021-01-05 PROCEDURE — 85027 COMPLETE CBC AUTOMATED: CPT | Performed by: FAMILY MEDICINE

## 2021-01-05 PROCEDURE — 84443 ASSAY THYROID STIM HORMONE: CPT | Performed by: FAMILY MEDICINE

## 2021-01-05 PROCEDURE — G0439 PPPS, SUBSEQ VISIT: HCPCS | Performed by: FAMILY MEDICINE

## 2021-01-05 PROCEDURE — 84460 ALANINE AMINO (ALT) (SGPT): CPT | Performed by: FAMILY MEDICINE

## 2021-01-05 PROCEDURE — 84450 TRANSFERASE (AST) (SGOT): CPT | Performed by: FAMILY MEDICINE

## 2021-01-05 RX ORDER — PANTOPRAZOLE SODIUM 40 MG/1
40 TABLET, DELAYED RELEASE ORAL
Qty: 90 TABLET | Refills: 1 | Status: SHIPPED | OUTPATIENT
Start: 2021-01-05 | End: 2021-06-30

## 2021-01-05 NOTE — PROGRESS NOTES
Mercedez Jackson. is a 65year old male.     CC:  CPX    HPI:  Yearly PX    Last lipid:  Lab Results   Component Value Date    CHOLEST 256 (H) 07/30/2019    TRIG 129 07/30/2019    HDL 91 (H) 07/30/2019     (H) 07/30/2019    VLDL 26 07/30/2019    T have a healthcare power of ?: Yes    Do you have a living will?: Yes     Hearing Assessment (Required for AWV/SWV)      Hearing Screening    Time taken: 1/5/2021  8:05 AM  Entry User: Javi Armenta MD  Screening Method: Finger Rub  Finger Rub Res Performed by Matthew Beltrán MD at [address] W/REVISIONS      Family History   Problem Relation Age of Onset   • Other (PSYCHIATRIC PROBLEMS [Other]) Mother    • Other (CROHN'S [Other]) Father    • Other (Althea Cava Moving all extremities equally. Symmetric B patellar DTRs     ASSESSMENT AND PLAN    1. Medicare annual wellness visit, subsequent  Await results   He defers Pneumonia, Flu and Td   - VENIPUNCTURE  - ALT (SGPT); Future  - AST (SGOT);  Future  - BASIC METABO

## 2021-01-06 ENCOUNTER — TELEPHONE (OUTPATIENT)
Dept: FAMILY MEDICINE CLINIC | Facility: CLINIC | Age: 71
End: 2021-01-06

## 2021-01-06 RX ORDER — ROSUVASTATIN CALCIUM 5 MG/1
5 TABLET, COATED ORAL NIGHTLY
Qty: 90 TABLET | Refills: 0 | Status: SHIPPED | OUTPATIENT
Start: 2021-01-06 | End: 2021-03-31

## 2021-01-06 NOTE — TELEPHONE ENCOUNTER
----- Message from Sheridan King MD sent at 1/5/2021  4:55 PM CST -----  Please let patient know that the sugar, electrolyte, liver, kidney, and thyroid, tests were fine. There is no anemia and the white blood cell count is fine.    Prostate level is rochelle

## 2021-01-06 NOTE — TELEPHONE ENCOUNTER
I sent rx for low Crestor. It is a newer version of Lipitor. See me back in about 10 weeks and come fasting for repeat labs.   Thanks

## 2021-01-06 NOTE — TELEPHONE ENCOUNTER
Patient advised of the blood work results and options recommended. Patient states that he will start the cholesterol lowering medication. Please send to [facility]   When does he need to repeat labs?     He states that he was on Lipitor in the past bu

## 2021-03-31 RX ORDER — ROSUVASTATIN CALCIUM 5 MG/1
TABLET, COATED ORAL
Qty: 90 TABLET | Refills: 0 | Status: SHIPPED | OUTPATIENT
Start: 2021-03-31 | End: 2021-06-01

## 2021-04-06 ENCOUNTER — OFFICE VISIT (OUTPATIENT)
Dept: FAMILY MEDICINE CLINIC | Facility: CLINIC | Age: 71
End: 2021-04-06
Payer: MEDICARE

## 2021-04-06 VITALS
WEIGHT: 159 LBS | HEART RATE: 70 BPM | DIASTOLIC BLOOD PRESSURE: 88 MMHG | SYSTOLIC BLOOD PRESSURE: 138 MMHG | BODY MASS INDEX: 26 KG/M2 | OXYGEN SATURATION: 98 % | TEMPERATURE: 98 F | RESPIRATION RATE: 17 BRPM

## 2021-04-06 DIAGNOSIS — R42 VERTIGO: ICD-10-CM

## 2021-04-06 DIAGNOSIS — H61.21 IMPACTED CERUMEN OF RIGHT EAR: ICD-10-CM

## 2021-04-06 DIAGNOSIS — E78.5 HYPERLIPIDEMIA, UNSPECIFIED HYPERLIPIDEMIA TYPE: Primary | ICD-10-CM

## 2021-04-06 PROCEDURE — 80061 LIPID PANEL: CPT | Performed by: FAMILY MEDICINE

## 2021-04-06 PROCEDURE — 84450 TRANSFERASE (AST) (SGOT): CPT | Performed by: FAMILY MEDICINE

## 2021-04-06 PROCEDURE — 99214 OFFICE O/P EST MOD 30 MIN: CPT | Performed by: FAMILY MEDICINE

## 2021-04-06 PROCEDURE — 84460 ALANINE AMINO (ALT) (SGPT): CPT | Performed by: FAMILY MEDICINE

## 2021-04-06 NOTE — PROGRESS NOTES
Ana Moss. is a 65year old male. CC:  Patient presents with:  Medication Follow-Up: per pt      HPI:  Here to follow up hyperlipidemia. Started on Crestor in 1/2021. Had been on Lipitor in the past but it made him achy and angry.   Diet compli Nephrolithiasis 10/02/2009      Past Surgical History:   Procedure Laterality Date   • COLONOSCOPY  2014     Dr. Smiley Figueroa, diverticulosis   • ESOPHAGOGASTRODUODENOSCOPY, POSSIBLE BIOPSY, POSSIBLE POLYPECTOMY 21352 N/A 9/19/2019    Performed by Dorothy Hill No clubbing, cyanosis or edema  RECTAL: not examined  GENITAL: not examined  LYMPH: no supraclavicular nodes  MUSCULOSKELETAL: normal ambulation  NEURO: Awake and alert. Normal speech and articulation. No facial droop or asymmetry.  Moving all extremities e

## 2021-06-01 RX ORDER — ROSUVASTATIN CALCIUM 5 MG/1
TABLET, COATED ORAL
Qty: 90 TABLET | Refills: 1 | Status: SHIPPED | OUTPATIENT
Start: 2021-06-01 | End: 2021-12-06

## 2021-06-30 RX ORDER — PANTOPRAZOLE SODIUM 40 MG/1
TABLET, DELAYED RELEASE ORAL
Qty: 90 TABLET | Refills: 1 | Status: SHIPPED | OUTPATIENT
Start: 2021-06-30 | End: 2022-01-18

## 2021-06-30 NOTE — TELEPHONE ENCOUNTER
LOV 04/06/2021  Last labs 04/06/2021  Last refill on 01/05/2021, for #90 tabs, with 1 refills  Pantoprazole Sodium (PROTONIX) 40 MG Oral Tab EC    No future appointments. Order(s) pending, please review. Thank you.

## 2021-12-06 RX ORDER — ROSUVASTATIN CALCIUM 5 MG/1
TABLET, COATED ORAL
Qty: 90 TABLET | Refills: 0 | Status: SHIPPED | OUTPATIENT
Start: 2021-12-06

## 2021-12-23 ENCOUNTER — PATIENT OUTREACH (OUTPATIENT)
Dept: FAMILY MEDICINE CLINIC | Facility: CLINIC | Age: 71
End: 2021-12-23

## 2022-01-18 RX ORDER — PANTOPRAZOLE SODIUM 40 MG/1
TABLET, DELAYED RELEASE ORAL
Qty: 90 TABLET | Refills: 0 | Status: SHIPPED | OUTPATIENT
Start: 2022-01-18

## 2022-01-20 ENCOUNTER — OFFICE VISIT (OUTPATIENT)
Dept: FAMILY MEDICINE CLINIC | Facility: CLINIC | Age: 72
End: 2022-01-20
Payer: MEDICARE

## 2022-01-20 VITALS
HEIGHT: 66.5 IN | BODY MASS INDEX: 25.57 KG/M2 | HEART RATE: 72 BPM | SYSTOLIC BLOOD PRESSURE: 120 MMHG | WEIGHT: 161 LBS | RESPIRATION RATE: 16 BRPM | DIASTOLIC BLOOD PRESSURE: 80 MMHG | OXYGEN SATURATION: 98 % | TEMPERATURE: 97 F

## 2022-01-20 DIAGNOSIS — K21.9 GERD WITHOUT ESOPHAGITIS: ICD-10-CM

## 2022-01-20 DIAGNOSIS — E78.5 HYPERLIPIDEMIA, UNSPECIFIED HYPERLIPIDEMIA TYPE: ICD-10-CM

## 2022-01-20 DIAGNOSIS — Z00.00 MEDICARE ANNUAL WELLNESS VISIT, SUBSEQUENT: Primary | ICD-10-CM

## 2022-01-20 DIAGNOSIS — Z12.5 PROSTATE CANCER SCREENING: ICD-10-CM

## 2022-01-20 PROBLEM — K92.1 BLACK TARRY STOOLS: Status: RESOLVED | Noted: 2019-09-10 | Resolved: 2022-01-20

## 2022-01-20 PROBLEM — F10.10 ALCOHOL ABUSE: Status: RESOLVED | Noted: 2019-09-10 | Resolved: 2022-01-20

## 2022-01-20 LAB
ALT SERPL-CCNC: 30 U/L
ANION GAP SERPL CALC-SCNC: 8 MMOL/L (ref 0–18)
AST SERPL-CCNC: 23 U/L (ref 15–37)
BUN BLD-MCNC: 14 MG/DL (ref 7–18)
CALCIUM BLD-MCNC: 9.4 MG/DL (ref 8.5–10.1)
CHLORIDE SERPL-SCNC: 105 MMOL/L (ref 98–112)
CHOLEST SERPL-MCNC: 205 MG/DL (ref ?–200)
CO2 SERPL-SCNC: 28 MMOL/L (ref 21–32)
COMPLEXED PSA SERPL-MCNC: 2.06 NG/ML (ref ?–4)
CREAT BLD-MCNC: 1.06 MG/DL
ERYTHROCYTE [DISTWIDTH] IN BLOOD BY AUTOMATED COUNT: 17.1 %
FASTING PATIENT LIPID ANSWER: YES
FASTING STATUS PATIENT QL REPORTED: YES
GLUCOSE BLD-MCNC: 95 MG/DL (ref 70–99)
HCT VFR BLD AUTO: 49.2 %
HDLC SERPL-MCNC: 90 MG/DL (ref 40–59)
HGB BLD-MCNC: 15.7 G/DL
LDLC SERPL CALC-MCNC: 105 MG/DL (ref ?–100)
MCH RBC QN AUTO: 27.6 PG (ref 26–34)
MCHC RBC AUTO-ENTMCNC: 31.9 G/DL (ref 31–37)
MCV RBC AUTO: 86.5 FL
NONHDLC SERPL-MCNC: 115 MG/DL (ref ?–130)
OSMOLALITY SERPL CALC.SUM OF ELEC: 292 MOSM/KG (ref 275–295)
PLATELET # BLD AUTO: 256 10(3)UL (ref 150–450)
POTASSIUM SERPL-SCNC: 4.4 MMOL/L (ref 3.5–5.1)
RBC # BLD AUTO: 5.69 X10(6)UL
SODIUM SERPL-SCNC: 141 MMOL/L (ref 136–145)
TRIGL SERPL-MCNC: 54 MG/DL (ref 30–149)
TSI SER-ACNC: 0.83 MIU/ML (ref 0.36–3.74)
VLDLC SERPL CALC-MCNC: 9 MG/DL (ref 0–30)
WBC # BLD AUTO: 7 X10(3) UL (ref 4–11)

## 2022-01-20 PROCEDURE — 85027 COMPLETE CBC AUTOMATED: CPT | Performed by: FAMILY MEDICINE

## 2022-01-20 PROCEDURE — 80048 BASIC METABOLIC PNL TOTAL CA: CPT | Performed by: FAMILY MEDICINE

## 2022-01-20 PROCEDURE — G0439 PPPS, SUBSEQ VISIT: HCPCS | Performed by: FAMILY MEDICINE

## 2022-01-20 PROCEDURE — 84460 ALANINE AMINO (ALT) (SGPT): CPT | Performed by: FAMILY MEDICINE

## 2022-01-20 PROCEDURE — 84450 TRANSFERASE (AST) (SGOT): CPT | Performed by: FAMILY MEDICINE

## 2022-01-20 PROCEDURE — 80061 LIPID PANEL: CPT | Performed by: FAMILY MEDICINE

## 2022-01-20 PROCEDURE — 84443 ASSAY THYROID STIM HORMONE: CPT | Performed by: FAMILY MEDICINE

## 2022-01-20 NOTE — PROGRESS NOTES
Angie Moreno. is a 66year old male.     CC:  Patient presents with:  Wellness Visit: Medicare Wellness Visit per pt      HPI:  Yearly PX    Last lipid:  Lab Results   Component Value Date    CHOLEST 201 (H) 04/06/2021    TRIG 60 04/06/2021    HDL 88 No    Difficulty walking?: No    Difficulty dressing or bathing?: No    Problems with daily activities? : No    Memory Problems?: No      Fall/Risk Assessment      LOW       Depression Screening (PHQ-2/PHQ-9): Over the LAST 2 WEEKS        Depression Screen History:  Past Medical History:   Diagnosis Date   • Diverticulosis    • DJD (degenerative joint disease), lumbar 8/13/07   • Fatty liver 09/2019    Seen on liver u/s   • H. pylori infection    • Hepatic cyst 10/2/2009   • Hyperlipemia 8/8/2007   • Horacio PSYCH: alert and oriented x 3; affect appropriate  SKIN: not examined  BREAST: not examined/not applicable  EXTREMITIES: No clubbing, cyanosis or edema  RECTAL: not examined  GENITAL: not examined  LYMPH: no supraclavicular nodes  MUSCULOSKELETAL: normal Answer: Immediate      None    Meds & Refills for this Visit:  Requested Prescriptions      No prescriptions requested or ordered in this encounter         No follow-ups on file.       Authorized by Yolanda Marinelli M.D.

## 2022-04-09 RX ORDER — ROSUVASTATIN CALCIUM 5 MG/1
TABLET, COATED ORAL
Qty: 90 TABLET | Refills: 1 | Status: SHIPPED | OUTPATIENT
Start: 2022-04-09

## 2022-04-09 NOTE — TELEPHONE ENCOUNTER
Cholesterol Medication Protocol Passed 04/09/2022 07:28 AM   Protocol Details  ALT < 80    ALT resulted within past year    Lipid panel within past 12 months    Appointment within past 12 or next 3 months     Last OV 1/20/22  Last lab 1/20/22 lipid, ALT  30  Last refilled 12/6/21   #90  0 refills      Refilled x 6 months per protocol

## 2022-04-26 RX ORDER — PANTOPRAZOLE SODIUM 40 MG/1
TABLET, DELAYED RELEASE ORAL
Qty: 90 TABLET | Refills: 2 | Status: SHIPPED | OUTPATIENT
Start: 2022-04-26

## 2022-04-26 NOTE — TELEPHONE ENCOUNTER
Last refilled 1/18/22 for #90 with 0 RF  LOV with TJ 1/20/22  No future appt with pcp  Protocol: none

## 2022-06-29 ENCOUNTER — TELEPHONE (OUTPATIENT)
Dept: FAMILY MEDICINE CLINIC | Facility: CLINIC | Age: 72
End: 2022-06-29

## 2022-06-29 ENCOUNTER — OFFICE VISIT (OUTPATIENT)
Dept: FAMILY MEDICINE CLINIC | Facility: CLINIC | Age: 72
End: 2022-06-29
Payer: MEDICARE

## 2022-06-29 VITALS
DIASTOLIC BLOOD PRESSURE: 80 MMHG | BODY MASS INDEX: 25 KG/M2 | WEIGHT: 158 LBS | HEART RATE: 98 BPM | SYSTOLIC BLOOD PRESSURE: 122 MMHG | TEMPERATURE: 99 F | OXYGEN SATURATION: 97 %

## 2022-06-29 DIAGNOSIS — R10.13 EPIGASTRIC PAIN: Primary | ICD-10-CM

## 2022-06-29 LAB
ALBUMIN SERPL-MCNC: 3.9 G/DL (ref 3.4–5)
ALBUMIN/GLOB SERPL: 1.2 {RATIO} (ref 1–2)
ALP LIVER SERPL-CCNC: 115 U/L
ALT SERPL-CCNC: 53 U/L
AMYLASE SERPL-CCNC: 53 U/L (ref 25–115)
ANION GAP SERPL CALC-SCNC: 7 MMOL/L (ref 0–18)
AST SERPL-CCNC: 39 U/L (ref 15–37)
BILIRUB SERPL-MCNC: 1.2 MG/DL (ref 0.1–2)
BUN BLD-MCNC: 20 MG/DL (ref 7–18)
CALCIUM BLD-MCNC: 9.2 MG/DL (ref 8.5–10.1)
CHLORIDE SERPL-SCNC: 104 MMOL/L (ref 98–112)
CO2 SERPL-SCNC: 27 MMOL/L (ref 21–32)
CREAT BLD-MCNC: 1 MG/DL
ERYTHROCYTE [DISTWIDTH] IN BLOOD BY AUTOMATED COUNT: 18.2 %
FASTING STATUS PATIENT QL REPORTED: NO
GLOBULIN PLAS-MCNC: 3.3 G/DL (ref 2.8–4.4)
GLUCOSE BLD-MCNC: 86 MG/DL (ref 70–99)
HCT VFR BLD AUTO: 46.2 %
HGB BLD-MCNC: 14.7 G/DL
MCH RBC QN AUTO: 27.9 PG (ref 26–34)
MCHC RBC AUTO-ENTMCNC: 31.8 G/DL (ref 31–37)
MCV RBC AUTO: 87.8 FL
OSMOLALITY SERPL CALC.SUM OF ELEC: 288 MOSM/KG (ref 275–295)
PLATELET # BLD AUTO: 254 10(3)UL (ref 150–450)
POTASSIUM SERPL-SCNC: 4.1 MMOL/L (ref 3.5–5.1)
PROT SERPL-MCNC: 7.2 G/DL (ref 6.4–8.2)
RBC # BLD AUTO: 5.26 X10(6)UL
SODIUM SERPL-SCNC: 138 MMOL/L (ref 136–145)
WBC # BLD AUTO: 14.5 X10(3) UL (ref 4–11)

## 2022-06-29 PROCEDURE — 85027 COMPLETE CBC AUTOMATED: CPT | Performed by: FAMILY MEDICINE

## 2022-06-29 PROCEDURE — 80053 COMPREHEN METABOLIC PANEL: CPT | Performed by: FAMILY MEDICINE

## 2022-06-29 PROCEDURE — 82150 ASSAY OF AMYLASE: CPT | Performed by: FAMILY MEDICINE

## 2022-06-29 PROCEDURE — 99215 OFFICE O/P EST HI 40 MIN: CPT | Performed by: FAMILY MEDICINE

## 2022-06-29 RX ORDER — SUCRALFATE 1 G/1
1 TABLET ORAL
Qty: 60 TABLET | Refills: 0 | Status: SHIPPED | OUTPATIENT
Start: 2022-06-29 | End: 2022-07-14
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
## 2022-06-30 ENCOUNTER — HOSPITAL ENCOUNTER (OUTPATIENT)
Dept: ULTRASOUND IMAGING | Age: 72
Discharge: HOME | End: 2022-06-30
Attending: FAMILY MEDICINE
Payer: MEDICARE

## 2022-06-30 DIAGNOSIS — R10.13 EPIGASTRIC PAIN: ICD-10-CM

## 2022-06-30 PROCEDURE — 76700 US EXAM ABDOM COMPLETE: CPT | Performed by: FAMILY MEDICINE

## 2022-11-04 ENCOUNTER — TELEPHONE (OUTPATIENT)
Dept: FAMILY MEDICINE CLINIC | Facility: CLINIC | Age: 72
End: 2022-11-04

## 2022-11-08 RX ORDER — ROSUVASTATIN CALCIUM 5 MG/1
TABLET, COATED ORAL
Qty: 90 TABLET | Refills: 1 | Status: SHIPPED | OUTPATIENT
Start: 2022-11-08

## 2022-11-08 NOTE — TELEPHONE ENCOUNTER
Cholesterol Medication Protocol Passed 11/08/2022 10:57 AM   Protocol Details  ALT < 80    ALT resulted within past year    Lipid panel within past 12 months    Appointment within past 12 or next 3 months      Refilled per protocol  ROSUVASTATIN 5 MG Oral Tab  Last refilled on 4/9/22 #90 with 1 rf.   LOV- 6/29/22  Last labs- 1/20/22    Sent to pharmacy

## 2023-01-24 ENCOUNTER — OFFICE VISIT (OUTPATIENT)
Dept: FAMILY MEDICINE CLINIC | Facility: CLINIC | Age: 73
End: 2023-01-24
Payer: MEDICARE

## 2023-01-24 VITALS
DIASTOLIC BLOOD PRESSURE: 90 MMHG | OXYGEN SATURATION: 99 % | BODY MASS INDEX: 25.34 KG/M2 | TEMPERATURE: 98 F | HEART RATE: 74 BPM | HEIGHT: 65.75 IN | SYSTOLIC BLOOD PRESSURE: 150 MMHG | WEIGHT: 155.81 LBS | RESPIRATION RATE: 16 BRPM

## 2023-01-24 DIAGNOSIS — K21.9 GERD WITHOUT ESOPHAGITIS: ICD-10-CM

## 2023-01-24 DIAGNOSIS — Z12.5 PROSTATE CANCER SCREENING: ICD-10-CM

## 2023-01-24 DIAGNOSIS — E78.5 HYPERLIPIDEMIA, UNSPECIFIED HYPERLIPIDEMIA TYPE: ICD-10-CM

## 2023-01-24 DIAGNOSIS — Z00.00 MEDICARE ANNUAL WELLNESS VISIT, SUBSEQUENT: Primary | ICD-10-CM

## 2023-01-24 LAB
ALT SERPL-CCNC: 37 U/L
ANION GAP SERPL CALC-SCNC: 4 MMOL/L (ref 0–18)
AST SERPL-CCNC: 32 U/L (ref 15–37)
BUN BLD-MCNC: 18 MG/DL (ref 7–18)
CALCIUM BLD-MCNC: 9.8 MG/DL (ref 8.5–10.1)
CHLORIDE SERPL-SCNC: 105 MMOL/L (ref 98–112)
CHOLEST SERPL-MCNC: 162 MG/DL (ref ?–200)
CO2 SERPL-SCNC: 29 MMOL/L (ref 21–32)
COMPLEXED PSA SERPL-MCNC: 2.74 NG/ML (ref ?–4)
CREAT BLD-MCNC: 1.11 MG/DL
ERYTHROCYTE [DISTWIDTH] IN BLOOD BY AUTOMATED COUNT: 18.3 %
FASTING PATIENT LIPID ANSWER: YES
FASTING STATUS PATIENT QL REPORTED: YES
GFR SERPLBLD BASED ON 1.73 SQ M-ARVRAT: 70 ML/MIN/1.73M2 (ref 60–?)
GLUCOSE BLD-MCNC: 95 MG/DL (ref 70–99)
HCT VFR BLD AUTO: 46.4 %
HDLC SERPL-MCNC: 81 MG/DL (ref 40–59)
HGB BLD-MCNC: 15.2 G/DL
LDLC SERPL CALC-MCNC: 71 MG/DL (ref ?–100)
MCH RBC QN AUTO: 27.9 PG (ref 26–34)
MCHC RBC AUTO-ENTMCNC: 32.8 G/DL (ref 31–37)
MCV RBC AUTO: 85.3 FL
NONHDLC SERPL-MCNC: 81 MG/DL (ref ?–130)
OSMOLALITY SERPL CALC.SUM OF ELEC: 288 MOSM/KG (ref 275–295)
PLATELET # BLD AUTO: 273 10(3)UL (ref 150–450)
POTASSIUM SERPL-SCNC: 4.7 MMOL/L (ref 3.5–5.1)
RBC # BLD AUTO: 5.44 X10(6)UL
SODIUM SERPL-SCNC: 138 MMOL/L (ref 136–145)
TRIGL SERPL-MCNC: 48 MG/DL (ref 30–149)
VLDLC SERPL CALC-MCNC: 7 MG/DL (ref 0–30)
WBC # BLD AUTO: 6.9 X10(3) UL (ref 4–11)

## 2023-01-24 PROCEDURE — 80048 BASIC METABOLIC PNL TOTAL CA: CPT | Performed by: FAMILY MEDICINE

## 2023-01-24 PROCEDURE — 80061 LIPID PANEL: CPT | Performed by: FAMILY MEDICINE

## 2023-01-24 PROCEDURE — G0439 PPPS, SUBSEQ VISIT: HCPCS | Performed by: FAMILY MEDICINE

## 2023-01-24 PROCEDURE — 85027 COMPLETE CBC AUTOMATED: CPT | Performed by: FAMILY MEDICINE

## 2023-01-24 PROCEDURE — 1125F AMNT PAIN NOTED PAIN PRSNT: CPT | Performed by: FAMILY MEDICINE

## 2023-01-24 PROCEDURE — 84450 TRANSFERASE (AST) (SGOT): CPT | Performed by: FAMILY MEDICINE

## 2023-01-24 PROCEDURE — 84460 ALANINE AMINO (ALT) (SGPT): CPT | Performed by: FAMILY MEDICINE

## 2023-09-19 ENCOUNTER — OFFICE VISIT (OUTPATIENT)
Dept: FAMILY MEDICINE CLINIC | Facility: CLINIC | Age: 73
End: 2023-09-19
Payer: MEDICARE

## 2023-09-19 ENCOUNTER — HOSPITAL ENCOUNTER (OUTPATIENT)
Dept: GENERAL RADIOLOGY | Age: 73
Discharge: HOME | End: 2023-09-19
Attending: FAMILY MEDICINE
Payer: MEDICARE

## 2023-09-19 VITALS
SYSTOLIC BLOOD PRESSURE: 150 MMHG | TEMPERATURE: 98 F | BODY MASS INDEX: 25 KG/M2 | DIASTOLIC BLOOD PRESSURE: 90 MMHG | RESPIRATION RATE: 16 BRPM | OXYGEN SATURATION: 98 % | WEIGHT: 152 LBS | HEART RATE: 83 BPM

## 2023-09-19 DIAGNOSIS — M25.562 LEFT KNEE PAIN, UNSPECIFIED CHRONICITY: ICD-10-CM

## 2023-09-19 DIAGNOSIS — M25.562 LEFT KNEE PAIN, UNSPECIFIED CHRONICITY: Primary | ICD-10-CM

## 2023-09-19 PROCEDURE — 99213 OFFICE O/P EST LOW 20 MIN: CPT | Performed by: FAMILY MEDICINE

## 2023-09-19 PROCEDURE — 73560 X-RAY EXAM OF KNEE 1 OR 2: CPT | Performed by: FAMILY MEDICINE

## 2023-09-20 RX ORDER — PREDNISONE 20 MG/1
TABLET ORAL
Qty: 18 TABLET | Refills: 0 | Status: SHIPPED | OUTPATIENT
Start: 2023-09-20 | End: 2023-09-29

## 2023-10-02 ENCOUNTER — TELEPHONE (OUTPATIENT)
Dept: FAMILY MEDICINE CLINIC | Facility: CLINIC | Age: 73
End: 2023-10-02

## 2023-10-02 ENCOUNTER — TELEPHONE (OUTPATIENT)
Dept: ORTHOPEDICS CLINIC | Facility: CLINIC | Age: 73
End: 2023-10-02

## 2023-10-02 NOTE — TELEPHONE ENCOUNTER
PATIENT CALLING THIS MORNING ASKING TO PASS A MESSAGE TO DR FLOREZ. HE SAYS HIS KNEE HURTS EVEN MORE. ASKING TO SEE AN ORTHO

## 2023-10-02 NOTE — TELEPHONE ENCOUNTER
Pt requesting copy of recent x-rays for upcoming ortho appt. Please call pt when copy is made and ready for .

## 2023-10-02 NOTE — TELEPHONE ENCOUNTER
Imaging was completed for this patient for a lt knee pain , but it needs to be reviewed to see if additional imaging is needed. If so, please enter the appropriate RX and let the patient know to come in before their appointment to complete the additional imaging. Thank you!     Future Appointments   Date Time Provider Sandro Guevara   10/10/2023 11:30 AM Juan Villalpando PA-C [facility]

## 2023-10-04 ENCOUNTER — MED REC SCAN ONLY (OUTPATIENT)
Dept: FAMILY MEDICINE CLINIC | Facility: CLINIC | Age: 73
End: 2023-10-04

## 2024-01-10 ENCOUNTER — TELEPHONE (OUTPATIENT)
Dept: FAMILY MEDICINE CLINIC | Facility: CLINIC | Age: 74
End: 2024-01-10

## 2024-01-11 ENCOUNTER — TELEPHONE (OUTPATIENT)
Dept: FAMILY MEDICINE CLINIC | Facility: CLINIC | Age: 74
End: 2024-01-11

## 2024-02-13 ENCOUNTER — OFFICE VISIT (OUTPATIENT)
Dept: FAMILY MEDICINE CLINIC | Facility: CLINIC | Age: 74
End: 2024-02-13
Payer: MEDICARE

## 2024-02-13 VITALS
WEIGHT: 152 LBS | HEART RATE: 77 BPM | DIASTOLIC BLOOD PRESSURE: 88 MMHG | BODY MASS INDEX: 25 KG/M2 | TEMPERATURE: 97 F | RESPIRATION RATE: 16 BRPM | SYSTOLIC BLOOD PRESSURE: 138 MMHG | OXYGEN SATURATION: 98 %

## 2024-02-13 DIAGNOSIS — E78.5 HYPERLIPIDEMIA, UNSPECIFIED HYPERLIPIDEMIA TYPE: ICD-10-CM

## 2024-02-13 DIAGNOSIS — M25.562 LEFT KNEE PAIN, UNSPECIFIED CHRONICITY: ICD-10-CM

## 2024-02-13 DIAGNOSIS — K21.9 GERD WITHOUT ESOPHAGITIS: ICD-10-CM

## 2024-02-13 DIAGNOSIS — Z12.5 PROSTATE CANCER SCREENING: ICD-10-CM

## 2024-02-13 DIAGNOSIS — Z00.00 MEDICARE ANNUAL WELLNESS VISIT, SUBSEQUENT: Primary | ICD-10-CM

## 2024-02-13 LAB
ANION GAP SERPL CALC-SCNC: 6 MMOL/L (ref 0–18)
AST SERPL-CCNC: 23 U/L (ref 15–37)
BUN BLD-MCNC: 19 MG/DL (ref 9–23)
CALCIUM BLD-MCNC: 9.8 MG/DL (ref 8.5–10.1)
CHLORIDE SERPL-SCNC: 104 MMOL/L (ref 98–112)
CHOLEST SERPL-MCNC: 190 MG/DL (ref ?–200)
CO2 SERPL-SCNC: 29 MMOL/L (ref 21–32)
COMPLEXED PSA SERPL-MCNC: 2.07 NG/ML (ref ?–4)
CREAT BLD-MCNC: 1.08 MG/DL
EGFRCR SERPLBLD CKD-EPI 2021: 72 ML/MIN/1.73M2 (ref 60–?)
ERYTHROCYTE [DISTWIDTH] IN BLOOD BY AUTOMATED COUNT: 19.6 %
FASTING PATIENT LIPID ANSWER: YES
FASTING STATUS PATIENT QL REPORTED: YES
GLUCOSE BLD-MCNC: 97 MG/DL (ref 70–99)
HCT VFR BLD AUTO: 50.4 %
HDLC SERPL-MCNC: 82 MG/DL (ref 40–59)
HGB BLD-MCNC: 16.1 G/DL
LDLC SERPL CALC-MCNC: 98 MG/DL (ref ?–100)
MCH RBC QN AUTO: 27.6 PG (ref 26–34)
MCHC RBC AUTO-ENTMCNC: 31.9 G/DL (ref 31–37)
MCV RBC AUTO: 86.4 FL
NONHDLC SERPL-MCNC: 108 MG/DL (ref ?–130)
OSMOLALITY SERPL CALC.SUM OF ELEC: 290 MOSM/KG (ref 275–295)
PLATELET # BLD AUTO: 269 10(3)UL (ref 150–450)
POTASSIUM SERPL-SCNC: 4.5 MMOL/L (ref 3.5–5.1)
RBC # BLD AUTO: 5.83 X10(6)UL
SODIUM SERPL-SCNC: 139 MMOL/L (ref 136–145)
TRIGL SERPL-MCNC: 51 MG/DL (ref 30–149)
TSI SER-ACNC: 1.01 MIU/ML (ref 0.36–3.74)
VLDLC SERPL CALC-MCNC: 8 MG/DL (ref 0–30)
WBC # BLD AUTO: 10.2 X10(3) UL (ref 4–11)

## 2024-02-13 PROCEDURE — G0439 PPPS, SUBSEQ VISIT: HCPCS | Performed by: FAMILY MEDICINE

## 2024-02-13 PROCEDURE — 84443 ASSAY THYROID STIM HORMONE: CPT | Performed by: FAMILY MEDICINE

## 2024-02-13 PROCEDURE — 85027 COMPLETE CBC AUTOMATED: CPT | Performed by: FAMILY MEDICINE

## 2024-02-13 PROCEDURE — 80048 BASIC METABOLIC PNL TOTAL CA: CPT | Performed by: FAMILY MEDICINE

## 2024-02-13 PROCEDURE — 84450 TRANSFERASE (AST) (SGOT): CPT | Performed by: FAMILY MEDICINE

## 2024-02-13 PROCEDURE — 80061 LIPID PANEL: CPT | Performed by: FAMILY MEDICINE

## 2024-02-13 NOTE — PROGRESS NOTES
Duran Harry Jr. is a 73 year old male.    CC:    Chief Complaint   Patient presents with    Physical     MAW       HPI:  Yearly PX    Last PSA:   Recent Results (from the past [id number] hour(s))   PSA Screen    Collection Time: 01/24/23  8:51 AM   Result Value Ref Range    Prostate Specific Antigen Screen 2.74 <=4.00 ng/mL     *Note: Due to a large number of results and/or encounters for the requested time period, some results have not been displayed. A complete set of results can be found in Results Review.        Last lipid:  Lab Results   Component Value Date    CHOLEST 162 01/24/2023    TRIG 48 01/24/2023    HDL 81 (H) 01/24/2023    LDL 71 01/24/2023    VLDL 7 01/24/2023    TCHDLRATIO 6.87 (H) 12/19/2017    NONHDLC 81 01/24/2023       Last Colon Cancer screening: Colonoscopy 09/25/2014 to be repeated 9/2024      Immunization History   Administered Date(s) Administered    TDAP 05/11/2009   Deferred Date(s) Deferred    Pneumococcal (Prevnar 13) 07/26/2017       Patient Active Problem List   Diagnosis    Hyperlipidemia: Crestor, due for lipids    GERD without esophagitis: PPI, no dysphagia        General Health     In the past six months, have you lost more than 10 pounds without trying?: 2 - No    Has your appetite been poor?: No    Type of Diet: Balanced    How does the patient maintain a good energy level?: Appropriate Exercise;Daily Walks    How would you describe your daily physical activity?: Moderate    How would you describe your current health state?: Good    How do you maintain positive mental well-being?: Social Interaction;Puzzles;Games;Visiting Friends;Visiting Family         Have you had any immunizations at another office such as Influenza, Hepatitis B, Tetanus, or Pneumococcal?: No     Functional Ability     Bathing or Showering: Able without help    Toileting: Able without help    Dressing: Able without help    Eating: Able without help    Driving: Able without help    Preparing your meals: Able  without help    Managing money/bills: Able without help    Taking medications as prescribed: Able without help    Are you able to afford your medications?: Yes    Hearing Problems?: No     Functional Status     Hearing Problems?: No    Vision Problems? : No    Difficulty walking?: No    Difficulty dressing or bathing?: No    Problems with daily activities? : No    Memory Problems?: No      Fall Screen: See Flow sheet  Fall Risk Assessment:   He has been screened for Falls and is low risk.        PHQ-2 SCORE: 0  , done 2/13/2024           Advance Directives     Do you have a healthcare power of ?: Yes    Do you have a living will?: No     Hearing Assessment (Required for AWV/SWV)      Hearing Screening    Time taken: 2/13/2024  8:43 AM  Screening Method: Finger Rub  Finger Rub Result: Pass               Visual Acuity     Right Eye Visual Acuity: Corrected Left Eye Visual Acuity: Corrected   Right Eye Chart Acuity: 20/25 Left Eye Chart Acuity: 20/25     Cognitive Assessment     What day of the week is this?: Correct    What month is it?: Correct    What year is it?: Correct    Recall \"Ball\": Correct    Recall \"Flag\": Correct    Recall \"Tree\": Correct         Allergies:  Allergies   Allergen Reactions    Demerol OTHER (SEE COMMENTS)     Had red streak up arm when given I.V.      Current Meds:  Current Outpatient Medications   Medication Sig Dispense Refill    rosuvastatin 5 MG Oral Tab Take 1 tablet (5 mg total) by mouth nightly. 90 tablet 2    pantoprazole 40 MG Oral Tab EC Take 1 tablet (40 mg total) by mouth daily as needed. 90 tablet 3    Multiple Vitamins-Minerals (ONE DAILY MENS 50+ MULTIVIT OR) Take by mouth daily.      Omega-3 Fatty Acids (FISH OIL OR) Take by mouth daily.      Ascorbic Acid (VITAMIN C OR) Take by mouth daily.      Zinc 100 MG Oral Tab Take by mouth daily.          History:  Past Medical History:   Diagnosis Date    Diverticulosis     DJD (degenerative joint disease), lumbar 8/13/07     Fatty liver 2019    Seen on liver u/s    H. pylori infection     Hepatic cyst 10/2/2009    Hyperlipemia 2007    Lung nodule 10/02/2009    L PUILMONARY NODULE    Nephrolithiasis 10/02/2009      Past Surgical History:   Procedure Laterality Date    COLONOSCOPY       Dr. Mccord, diverticulosis    INGUINAL HERNIORRHAPHY      BILAT W/REVISIONS      Family History   Problem Relation Age of Onset    Other (PSYCHIATRIC PROBLEMS [Other]) Mother     Other (CROHN'S [Other]) Father     Other (NEPHROLITIASIS [Other]) Father       Family Status   Relation Status    Fa  at age 69        CVA    Mo  at age 78        DEMENTIA      Social History     Socioeconomic History    Marital status:    Occupational History    Occupation: RETIRED   Tobacco Use    Smoking status: Never    Smokeless tobacco: Never    Tobacco comments:     CURRENT NON SMOKER   Vaping Use    Vaping Use: Never used   Substance and Sexual Activity    Alcohol use: Yes     Alcohol/week: 0.0 standard drinks of alcohol     Comment: 12 beers per week    Drug use: No        ROS:  General: energy level stable  Cardiovascular/Pulses: Denies exertional chest pain or pressure  Musculoskeletal: He is planning on a L knee replacement later this year    Vitals: /88 (BP Location: Left arm, Patient Position: Sitting, Cuff Size: adult)   Pulse 77   Temp 97.2 °F (36.2 °C)   Resp 16   Wt 152 lb (68.9 kg)   SpO2 98%   BMI 24.72 kg/m²    Reviewed by CLARY Stokes M.D.    Physical Exam:  GEN: well developed, well nourished, in no apparent distress  EYE: B conjunctiva and lids normal  HENT: B pinnas, external auditory canals and tympanic membranes are normal. No oral lesions.   NECK: No lymphadenopathy, thyromegaly or masses  CAR: S1, S2 normal, RRR; no S3, no S4; no click; murmur negative  PULM: clear to auscultation B, no accessory muscle use  GI: normal active BS+, soft, nondistended; no HSM; no masses; no bruits; no masses; nontender, no  G/R/R   PSYCH: alert and oriented x 3; affect appropriate  SKIN: not examined  BREAST: not examined/not applicable  EXTREMITIES: No clubbing, cyanosis or edema  RECTAL: not examined  GENITAL: not examined  LYMPH: no supraclavicular nodes  MUSCULOSKELETAL: + L knee bony hypertrophy  NEURO: Awake and alert. Normal speech and articulation. No facial droop or asymmetry. Moving all extremities equally. Symmetric B patellar DTRs    ASSESSMENT AND PLAN    1. Medicare annual wellness visit, subsequent  Colon cancer screen in 9/2024. Fine to do Cologuard  - VENIPUNCTURE  - AST (SGOT); Future  - Basic Metabolic Panel (8); Future  - CBC, Platelet; No Differential; Future  - PSA Total, Screen; Future  - Lipid Panel; Future  - TSH W Reflex To Free T4; Future    2. Hyperlipidemia, unspecified hyperlipidemia type  Stable   Continue present medications   Await results   - AST (SGOT); Future  - Lipid Panel; Future    3. GERD without esophagitis  Stable   Try to use PPI prn. Can use TUMS prn on day he does not take PPI  Await results   - Basic Metabolic Panel (8); Future  - CBC, Platelet; No Differential; Future    4. Left knee pain, unspecified chronicity  He will f/u for pre op clearance once he has a date for replacement surgyer    5. Prostate cancer screening  Await results   - PSA Total, Screen; Future      No follow-ups on file.    Orders for this visit:    Orders Placed This Encounter   Procedures    AST (SGOT)     Standing Status:   Future     Standing Expiration Date:   2/13/2025    Basic Metabolic Panel (8)     Standing Status:   Future     Standing Expiration Date:   2/13/2025    CBC, Platelet; No Differential     Standing Status:   Future     Standing Expiration Date:   2/13/2025    PSA Total, Screen     Standing Status:   Future     Standing Expiration Date:   2/13/2025    Lipid Panel     Standing Status:   Future     Standing Expiration Date:   2/13/2025    TSH W Reflex To Free T4     Standing Status:   Future      Standing Expiration Date:   2/13/2025    VENIPUNCTURE     Order Specific Question:   Release to patient     Answer:   Immediate       None    Meds & Refills for this Visit:  Requested Prescriptions      No prescriptions requested or ordered in this encounter             Authorized by Ashish Stokes M.D.

## 2024-03-07 RX ORDER — ROSUVASTATIN CALCIUM 5 MG/1
5 TABLET, COATED ORAL NIGHTLY
Qty: 90 TABLET | Refills: 3 | Status: SHIPPED | OUTPATIENT
Start: 2024-03-07

## 2024-03-14 RX ORDER — PANTOPRAZOLE SODIUM 40 MG/1
40 TABLET, DELAYED RELEASE ORAL DAILY PRN
Qty: 90 TABLET | Refills: 3 | Status: SHIPPED | OUTPATIENT
Start: 2024-03-14

## 2024-04-30 DIAGNOSIS — J44.9 STAGE 2 MODERATE COPD BY GOLD CLASSIFICATION  (CMD): Primary | ICD-10-CM

## 2024-05-31 ENCOUNTER — APPOINTMENT (OUTPATIENT)
Dept: PULMONOLOGY | Age: 74
End: 2024-05-31
Attending: INTERNAL MEDICINE

## 2024-05-31 ENCOUNTER — TELEPHONE (OUTPATIENT)
Dept: FAMILY MEDICINE CLINIC | Facility: CLINIC | Age: 74
End: 2024-05-31

## 2024-05-31 NOTE — TELEPHONE ENCOUNTER
PATIENT IS SCHEDULED FOR HIS PRE OP WITH DR FLOREZ ON 7-.  PATIENT HAVING TOTAL LEFT KNEE REPLACEMENT ON 7- WITH DR REJI LONGORIA-MARCOS @   [phone]    PATIENT ASKING IF HE NEEDS TO FAST.

## 2024-06-06 NOTE — TELEPHONE ENCOUNTER
Spoke to DR REJI CALDERÓN office VON-MARCOS @   [phone]    They will fax the pre op information    Patient's name and  verified   Patient is going to fast for the labs  Patient notified and verbalized an understanding

## 2024-07-10 ENCOUNTER — OFFICE VISIT (OUTPATIENT)
Dept: FAMILY MEDICINE CLINIC | Facility: CLINIC | Age: 74
End: 2024-07-10
Payer: MEDICARE

## 2024-07-10 VITALS
SYSTOLIC BLOOD PRESSURE: 128 MMHG | HEART RATE: 51 BPM | WEIGHT: 150.38 LBS | TEMPERATURE: 99 F | DIASTOLIC BLOOD PRESSURE: 78 MMHG | BODY MASS INDEX: 24 KG/M2 | OXYGEN SATURATION: 97 %

## 2024-07-10 DIAGNOSIS — M17.12 ARTHRITIS OF LEFT KNEE: ICD-10-CM

## 2024-07-10 DIAGNOSIS — R94.31 ABNORMAL EKG: ICD-10-CM

## 2024-07-10 DIAGNOSIS — Z01.818 PREOP EXAMINATION: Primary | ICD-10-CM

## 2024-07-10 DIAGNOSIS — T14.8XXA BRUISING: ICD-10-CM

## 2024-07-10 LAB
ALBUMIN SERPL-MCNC: 4 G/DL (ref 3.4–5)
ALBUMIN/GLOB SERPL: 1.1 {RATIO} (ref 1–2)
ALP LIVER SERPL-CCNC: 81 U/L
ALT SERPL-CCNC: 35 U/L
ANION GAP SERPL CALC-SCNC: 3 MMOL/L (ref 0–18)
APTT PPP: 29.9 SECONDS (ref 23–36)
AST SERPL-CCNC: 31 U/L (ref 15–37)
ATRIAL RATE: 77 BPM
BASOPHILS # BLD AUTO: 0.07 X10(3) UL (ref 0–0.2)
BASOPHILS NFR BLD AUTO: 0.6 %
BILIRUB SERPL-MCNC: 0.7 MG/DL (ref 0.1–2)
BUN BLD-MCNC: 17 MG/DL (ref 9–23)
CALCIUM BLD-MCNC: 9.8 MG/DL (ref 8.5–10.1)
CHLORIDE SERPL-SCNC: 105 MMOL/L (ref 98–112)
CO2 SERPL-SCNC: 31 MMOL/L (ref 21–32)
CREAT BLD-MCNC: 1.16 MG/DL
EGFRCR SERPLBLD CKD-EPI 2021: 66 ML/MIN/1.73M2 (ref 60–?)
EOSINOPHIL # BLD AUTO: 0.07 X10(3) UL (ref 0–0.7)
EOSINOPHIL NFR BLD AUTO: 0.6 %
ERYTHROCYTE [DISTWIDTH] IN BLOOD BY AUTOMATED COUNT: 20.1 %
FASTING STATUS PATIENT QL REPORTED: YES
GLOBULIN PLAS-MCNC: 3.6 G/DL (ref 2.8–4.4)
GLUCOSE BLD-MCNC: 98 MG/DL (ref 70–99)
HCT VFR BLD AUTO: 50 %
HGB BLD-MCNC: 16.2 G/DL
IMM GRANULOCYTES # BLD AUTO: 0.15 X10(3) UL (ref 0–1)
IMM GRANULOCYTES NFR BLD: 1.2 %
INR BLD: 1.02 (ref 0.8–1.2)
LYMPHOCYTES # BLD AUTO: 1.2 X10(3) UL (ref 1–4)
LYMPHOCYTES NFR BLD AUTO: 9.7 %
MCH RBC QN AUTO: 27.9 PG (ref 26–34)
MCHC RBC AUTO-ENTMCNC: 32.4 G/DL (ref 31–37)
MCV RBC AUTO: 86.2 FL
MONOCYTES # BLD AUTO: 0.61 X10(3) UL (ref 0.1–1)
MONOCYTES NFR BLD AUTO: 4.9 %
NEUTROPHILS # BLD AUTO: 10.3 X10 (3) UL (ref 1.5–7.7)
NEUTROPHILS # BLD AUTO: 10.3 X10(3) UL (ref 1.5–7.7)
NEUTROPHILS NFR BLD AUTO: 83 %
OSMOLALITY SERPL CALC.SUM OF ELEC: 290 MOSM/KG (ref 275–295)
P AXIS: 50 DEGREES
P-R INTERVAL: 162 MS
PLATELET # BLD AUTO: 268 10(3)UL (ref 150–450)
POTASSIUM SERPL-SCNC: 4.6 MMOL/L (ref 3.5–5.1)
PROT SERPL-MCNC: 7.6 G/DL (ref 6.4–8.2)
PROTHROMBIN TIME: 13.4 SECONDS (ref 11.6–14.8)
Q-T INTERVAL: 432 MS
QRS DURATION: 120 MS
QTC CALCULATION (BEZET): 488 MS
R AXIS: 53 DEGREES
RBC # BLD AUTO: 5.8 X10(6)UL
SODIUM SERPL-SCNC: 139 MMOL/L (ref 136–145)
T AXIS: 36 DEGREES
VENTRICULAR RATE: 77 BPM
WBC # BLD AUTO: 12.4 X10(3) UL (ref 4–11)

## 2024-07-10 PROCEDURE — 85025 COMPLETE CBC W/AUTO DIFF WBC: CPT | Performed by: FAMILY MEDICINE

## 2024-07-10 PROCEDURE — 87081 CULTURE SCREEN ONLY: CPT | Performed by: FAMILY MEDICINE

## 2024-07-10 PROCEDURE — 85730 THROMBOPLASTIN TIME PARTIAL: CPT | Performed by: FAMILY MEDICINE

## 2024-07-10 PROCEDURE — 80053 COMPREHEN METABOLIC PANEL: CPT | Performed by: FAMILY MEDICINE

## 2024-07-10 PROCEDURE — 99214 OFFICE O/P EST MOD 30 MIN: CPT | Performed by: FAMILY MEDICINE

## 2024-07-10 PROCEDURE — 85610 PROTHROMBIN TIME: CPT | Performed by: FAMILY MEDICINE

## 2024-07-10 PROCEDURE — 93000 ELECTROCARDIOGRAM COMPLETE: CPT | Performed by: FAMILY MEDICINE

## 2024-07-10 NOTE — PROGRESS NOTES
Duran Harry Jr. is a 73 year old male.    CC:    Chief Complaint   Patient presents with    Pre-Op Exam       HPI:  I am seeing Duran Harry Jr. for preoperative evaluation at the request of Dr. Igor Ferrer MD for my evaluation of the patient's medical problems prior to the proposed procedure.    Indication for surgery: End stage left knee arthritis    Proposed procedure: Left knee total arthroplasty    Date Of Surgery: 2024    Surgical Facility:  [facility]  Allergies:  Allergies   Allergen Reactions    Demerol OTHER (SEE COMMENTS)     Had red streak up arm when given I.V.      Current Meds:  Current Outpatient Medications   Medication Sig Dispense Refill    pantoprazole 40 MG Oral Tab EC Take 1 tablet (40 mg total) by mouth daily as needed. 90 tablet 3    rosuvastatin 5 MG Oral Tab Take 1 tablet (5 mg total) by mouth nightly. 90 tablet 3    Zinc 100 MG Oral Tab Take by mouth daily.          History:  Past Medical History:    Diverticulosis    DJD (degenerative joint disease), lumbar    Fatty liver    Seen on liver u/s    H. pylori infection    Hepatic cyst    Hyperlipemia    Lung nodule    L PUILMONARY NODULE    Nephrolithiasis      Past Surgical History:   Procedure Laterality Date    Colonoscopy       Dr. Mccord, diverticulosis    Inguinal herniorrhaphy      BILAT W/REVISIONS      Family History   Problem Relation Age of Onset    Other (PSYCHIATRIC PROBLEMS [Other]) Mother     Other (CROHN'S [Other]) Father     Other (NEPHROLITIASIS [Other]) Father       Family Status   Relation Status    Fa  at age 69        CVA    Mo  at age 78        DEMENTIA      Social History     Socioeconomic History    Marital status:    Occupational History    Occupation: RETIRED   Tobacco Use    Smoking status: Never    Smokeless tobacco: Never    Tobacco comments:     CURRENT NON SMOKER   Vaping Use    Vaping status: Never Used   Substance and Sexual Activity    Alcohol use: Yes      Alcohol/week: 0.0 standard drinks of alcohol     Comment: 12 beers per week    Drug use: No        ROS:  General: energy level stable  Cardiovascular/Pulses: Denies palpitations, exertional chest pain or pressure  Respiratory: Denies dyspnea on exertion    Vitals: /78   Pulse 51   Temp 98.9 °F (37.2 °C) (Temporal)   Wt 150 lb 6.4 oz (68.2 kg)   SpO2 97%   BMI 24.46 kg/m²    Reviewed by CLARY Stokes M.D.    Physical Exam:  GEN: well developed, well nourished, in no apparent distress  EYE: B conjunctiva and lids normal  HENT: normocephalic; normal nose, pharynx and TM's  NECK: No lymphadenopathy, thyromegaly or masses  CAR: S1, S2 normal, RRR; no S3, no S4; no click; murmur negative  PULM: clear to auscultation B, no accessory muscle use  GI: normal active BS+, soft, nondistended; no HSM; no masses; no bruits; no masses; nontender, no G/R/R   PSYCH: alert and oriented x 3; affect appropriate  SKIN: not examined  EXTREMITIES: No clubbing, cyanosis or edema  GENITAL: not examined  LYMPH: no supraclavicular nodes  NEURO: Awake and alert. Normal speech and articulation. No facial droop or asymmetry. Moving all extremities equally.    EKG  Sinus rhythm with marked sinus arrythmia with occasional and consecutive Premature ventricular complexes   Right bundle branch block   No previous ECGs found for comparison      Office Visit on 07/10/24   1. MSSA and MRSA Culture Screen     Status: None    Collection Time: 07/10/24  8:50 AM    Specimen: Nares; Other   Result Value Ref Range    MSSA/MRSA Culture No MRSA or Staph aureus(MSSA) Isolated N/A        Component      Latest Ref Rng 7/10/2024   WBC      4.0 - 11.0 x10(3) uL 12.4 (H)    RBC      3.80 - 5.80 x10(6)uL 5.80    Hemoglobin      13.0 - 17.5 g/dL 16.2    Hematocrit      39.0 - 53.0 % 50.0    Platelet Count      150.0 - 450.0 10(3)uL 268.0    MCV      80.0 - 100.0 fL 86.2    MCH      26.0 - 34.0 pg 27.9    MCHC      31.0 - 37.0 g/dL 32.4    RDW      % 20.1     Prelim Neutrophil Abs      1.50 - 7.70 x10 (3) uL 10.30 (H)    Neutrophils Absolute      1.50 - 7.70 x10(3) uL 10.30 (H)    Lymphocytes Absolute      1.00 - 4.00 x10(3) uL 1.20    Monocytes Absolute      0.10 - 1.00 x10(3) uL 0.61    Eosinophils Absolute      0.00 - 0.70 x10(3) uL 0.07    Basophils Absolute      0.00 - 0.20 x10(3) uL 0.07    Immature Granulocyte Absolute      0.00 - 1.00 x10(3) uL 0.15    Neutrophils %      % 83.0    Lymphocytes %      % 9.7    Monocytes %      % 4.9    Eosinophils %      % 0.6    Basophils %      % 0.6    Immature Granulocyte %      % 1.2    Glucose      70 - 99 mg/dL 98    Sodium      136 - 145 mmol/L 139    Potassium      3.5 - 5.1 mmol/L 4.6    Chloride      98 - 112 mmol/L 105    Carbon Dioxide, Total      21.0 - 32.0 mmol/L 31.0    ANION GAP      0 - 18 mmol/L 3    BUN      9 - 23 mg/dL 17    CREATININE      0.70 - 1.30 mg/dL 1.16    CALCIUM      8.5 - 10.1 mg/dL 9.8    CALCULATED OSMOLALITY      275 - 295 mOsm/kg 290    EGFR      >=60 mL/min/1.73m2 66    AST (SGOT)      15 - 37 U/L 31    ALT (SGPT)      16 - 61 U/L 35    ALKALINE PHOSPHATASE      45 - 117 U/L 81    Total Bilirubin      0.1 - 2.0 mg/dL 0.7    PROTEIN, TOTAL      6.4 - 8.2 g/dL 7.6    Albumin      3.4 - 5.0 g/dL 4.0    Globulin      2.8 - 4.4 g/dL 3.6    A/G Ratio      1.0 - 2.0  1.1    Patient Fasting for CMP? Yes    PT      11.6 - 14.8 seconds 13.4    INR      0.80 - 1.20  1.02    APTT      23.0 - 36.0 seconds 29.9       Legend:  (H) High    ==================================================================================================  Reading Physician Reading Date Result Priority   Clark Washington MD  [phone] 2024      Narrative  Transthoracic Echocardiogram    Name:Duran Harry    Date: 2024 :  1950 Ht:  (69in)  BP: 160 / 78  MRN:  [id number]    Age:  73years    Wt:  (150lb) HR: 71bpm  Loc:  [facility]       Gndr: M          BSA: 1.83m^2  Sonographer: Denisse LORENZO    Ordering:     Ashish Stokes    Referring:   Ashish Stokes    Conclusions:     1. Left ventricle: The cavity size was normal. Wall thickness was normal.      Systolic function was normal. The estimated ejection fraction was 60-65%,      by visual assessment. No diagnostic evidence for regional wall motion      abnormalities. Left ventricular diastolic function parameters were normal      for the patient's age.   2. Right ventricle: Systolic function was normal.   3. Left atrium: The left atrial volume was normal.   4. Aortic valve: Transvalvular velocity was increased. The findings were      consistent with mild stenosis. There was moderate regurgitation. The peak      systolic velocity was 2.18m/sec. The mean systolic gradient was 11mm Hg.   5. Tricuspid valve: There was trivial regurgitation.   6. Pulmonary arteries: Systolic pressure could not be accurately estimated.   ==========================================================  ASSESSMENT AND PLAN    1. Preop examination    - VENIPUNCTURE  - CBC With Differential With Platelet; Future  - Comp Metabolic Panel (14); Future  - PTT, Activated; Future  - Prothrombin Time (PT); Future  - ELECTROCARDIOGRAM, COMPLETE  - MSSA and MRSA Culture Screen; Future    2. Arthritis of left knee  As above  - VENIPUNCTURE  - CBC With Differential With Platelet; Future  - Comp Metabolic Panel (14); Future  - PTT, Activated; Future  - Prothrombin Time (PT); Future  - ELECTROCARDIOGRAM, COMPLETE  - MSSA and MRSA Culture Screen; Future    3. Abnormal EKG-- RBBB  ECHO done 7/11/2024 shows preserved LV function and no wall motion abnormality.    4. Moderate Aortic Regurgitation.  Seen on ECHO 7/11/2024. Preserved LV function. He is asymptomatic from a cardiac stand point  Will look to repeat ECHO in 2 years  Look to Keep BP 130s/80s range.    5. Leukocytosis  May have an early URI. Treated with Zpak.  Repeat CBC        No follow-ups on file.    Orders for this visit:    Orders Placed This  Encounter   Procedures    CBC With Differential With Platelet     Standing Status:   Future     Standing Expiration Date:   7/10/2025    Comp Metabolic Panel (14)     Standing Status:   Future     Standing Expiration Date:   7/10/2025    PTT, Activated     Standing Status:   Future     Standing Expiration Date:   7/10/2025    Prothrombin Time (PT)     Standing Status:   Future     Standing Expiration Date:   7/10/2025    VENIPUNCTURE     Order Specific Question:   Release to patient     Answer:   Immediate       ELECTROCARDIOGRAM, COMPLETE  CARD ECHO 2D DOPPLER (CPT=93306)    Meds & Refills for this Visit:  Requested Prescriptions      No prescriptions requested or ordered in this encounter             Authorized by Ashish Stokes M.D.

## 2024-07-11 ENCOUNTER — HOSPITAL ENCOUNTER (OUTPATIENT)
Dept: CV DIAGNOSTICS | Age: 74
Discharge: HOME | End: 2024-07-11
Attending: FAMILY MEDICINE
Payer: MEDICARE

## 2024-07-11 ENCOUNTER — TELEPHONE (OUTPATIENT)
Dept: FAMILY MEDICINE CLINIC | Facility: CLINIC | Age: 74
End: 2024-07-11

## 2024-07-11 DIAGNOSIS — Z01.818 PREOP EXAMINATION: Primary | ICD-10-CM

## 2024-07-11 DIAGNOSIS — R94.31 ABNORMAL EKG: ICD-10-CM

## 2024-07-11 DIAGNOSIS — Z01.818 PREOP EXAMINATION: ICD-10-CM

## 2024-07-11 DIAGNOSIS — M17.12 ARTHRITIS OF LEFT KNEE: ICD-10-CM

## 2024-07-11 PROCEDURE — 93306 TTE W/DOPPLER COMPLETE: CPT | Performed by: FAMILY MEDICINE

## 2024-07-11 RX ORDER — AZITHROMYCIN 250 MG/1
TABLET, FILM COATED ORAL
Qty: 6 TABLET | Refills: 0 | Status: SHIPPED | OUTPATIENT
Start: 2024-07-11 | End: 2024-07-15

## 2024-07-11 NOTE — TELEPHONE ENCOUNTER
Patient's name and  verified   Future Appointments   Date Time Provider Department Center   2024  8:45 AM [facility] NURSE VALENTIN Velazquez       Patient notified and verbalized an understanding

## 2024-07-11 NOTE — TELEPHONE ENCOUNTER
Pt received a call from [facility] stating prescription is ready.  Pt was not aware of a prescription being placed.  Advised pt nurse would call him back.

## 2024-07-11 NOTE — TELEPHONE ENCOUNTER
I want to put Duran on a Zpak antibiotic. Rx sent to [facility]. His white cell count can be a sign that he has an early bacterial infection somewhere.   Let's repeat his CBC in about one week and do the type and screen then too.  Thanks

## 2024-07-11 NOTE — TELEPHONE ENCOUNTER
----- Message from Joana COOK sent at 2024 10:47 AM CDT -----  Patient's name and  verified   Patient is not fighting a cold. Patient had a knee injection back in May.  Patient just got done with Echo.   Patient notified and verbalized an understanding

## 2024-07-12 ENCOUNTER — TELEPHONE (OUTPATIENT)
Dept: PHYSICAL THERAPY | Facility: HOSPITAL | Age: 74
End: 2024-07-12

## 2024-07-15 ENCOUNTER — ORDER TRANSCRIPTION (OUTPATIENT)
Dept: PHYSICAL THERAPY | Facility: HOSPITAL | Age: 74
End: 2024-07-15

## 2024-07-15 DIAGNOSIS — Z96.652 STATUS POST TOTAL KNEE REPLACEMENT, LEFT: Primary | ICD-10-CM

## 2024-07-15 DIAGNOSIS — M17.12 PRIMARY OSTEOARTHRITIS OF LEFT KNEE: ICD-10-CM

## 2024-07-18 ENCOUNTER — NURSE ONLY (OUTPATIENT)
Dept: FAMILY MEDICINE CLINIC | Facility: CLINIC | Age: 74
End: 2024-07-18
Payer: MEDICARE

## 2024-07-18 DIAGNOSIS — Z01.818 PREOP EXAMINATION: ICD-10-CM

## 2024-07-18 LAB
ANTIBODY SCREEN: NEGATIVE
BASOPHILS # BLD AUTO: 0.08 X10(3) UL (ref 0–0.2)
BASOPHILS NFR BLD AUTO: 0.7 %
EOSINOPHIL # BLD AUTO: 0.09 X10(3) UL (ref 0–0.7)
EOSINOPHIL NFR BLD AUTO: 0.8 %
ERYTHROCYTE [DISTWIDTH] IN BLOOD BY AUTOMATED COUNT: 19.7 %
HCT VFR BLD AUTO: 49.1 %
HGB BLD-MCNC: 16.3 G/DL
IMM GRANULOCYTES # BLD AUTO: 0.15 X10(3) UL (ref 0–1)
IMM GRANULOCYTES NFR BLD: 1.3 %
LYMPHOCYTES # BLD AUTO: 1.43 X10(3) UL (ref 1–4)
LYMPHOCYTES NFR BLD AUTO: 12.5 %
MCH RBC QN AUTO: 28.2 PG (ref 26–34)
MCHC RBC AUTO-ENTMCNC: 33.2 G/DL (ref 31–37)
MCV RBC AUTO: 84.8 FL
MONOCYTES # BLD AUTO: 0.75 X10(3) UL (ref 0.1–1)
MONOCYTES NFR BLD AUTO: 6.6 %
NEUTROPHILS # BLD AUTO: 8.95 X10 (3) UL (ref 1.5–7.7)
NEUTROPHILS # BLD AUTO: 8.95 X10(3) UL (ref 1.5–7.7)
NEUTROPHILS NFR BLD AUTO: 78.1 %
PLATELET # BLD AUTO: 264 10(3)UL (ref 150–450)
RBC # BLD AUTO: 5.79 X10(6)UL
RH BLOOD TYPE: POSITIVE
WBC # BLD AUTO: 11.5 X10(3) UL (ref 4–11)

## 2024-07-18 PROCEDURE — 86901 BLOOD TYPING SEROLOGIC RH(D): CPT | Performed by: FAMILY MEDICINE

## 2024-07-18 PROCEDURE — 86850 RBC ANTIBODY SCREEN: CPT | Performed by: FAMILY MEDICINE

## 2024-07-18 PROCEDURE — 86900 BLOOD TYPING SEROLOGIC ABO: CPT | Performed by: FAMILY MEDICINE

## 2024-07-18 PROCEDURE — 85025 COMPLETE CBC W/AUTO DIFF WBC: CPT | Performed by: FAMILY MEDICINE

## 2024-07-18 NOTE — PROGRESS NOTES
Patient to clinic for labs per Dr Stokes and Dr Ferrer  2 tubes drawn pink and lavender 1 attempt  Patient left office in stable condition

## 2024-07-19 ENCOUNTER — TELEPHONE (OUTPATIENT)
Dept: FAMILY MEDICINE CLINIC | Facility: CLINIC | Age: 74
End: 2024-07-19

## 2024-07-19 DIAGNOSIS — D72.825 BANDEMIA: ICD-10-CM

## 2024-07-19 DIAGNOSIS — Z01.818 PREOP EXAMINATION: Primary | ICD-10-CM

## 2024-07-19 RX ORDER — AZITHROMYCIN 250 MG/1
TABLET, FILM COATED ORAL
Qty: 6 TABLET | Refills: 0 | Status: SHIPPED | OUTPATIENT
Start: 2024-07-19 | End: 2024-07-23

## 2024-07-19 RX ORDER — CEFDINIR 300 MG/1
300 CAPSULE ORAL 2 TIMES DAILY
Qty: 14 CAPSULE | Refills: 0 | Status: SHIPPED | OUTPATIENT
Start: 2024-07-19 | End: 2024-07-26

## 2024-07-19 NOTE — TELEPHONE ENCOUNTER
Let him know that I sent in a Zpak and realized he was just on it. I need to place him on Omincef instead for 7 days. New rx sent.

## 2024-07-26 ENCOUNTER — NURSE ONLY (OUTPATIENT)
Dept: FAMILY MEDICINE CLINIC | Facility: CLINIC | Age: 74
End: 2024-07-26
Payer: MEDICARE

## 2024-07-26 DIAGNOSIS — D72.825 BANDEMIA: ICD-10-CM

## 2024-07-26 DIAGNOSIS — Z01.818 PREOP EXAMINATION: ICD-10-CM

## 2024-07-26 LAB
BASOPHILS # BLD AUTO: 0.08 X10(3) UL (ref 0–0.2)
BASOPHILS NFR BLD AUTO: 0.7 %
EOSINOPHIL # BLD AUTO: 0.09 X10(3) UL (ref 0–0.7)
EOSINOPHIL NFR BLD AUTO: 0.8 %
ERYTHROCYTE [DISTWIDTH] IN BLOOD BY AUTOMATED COUNT: 19.9 %
HCT VFR BLD AUTO: 49 %
HGB BLD-MCNC: 15.9 G/DL
IMM GRANULOCYTES # BLD AUTO: 0.1 X10(3) UL (ref 0–1)
IMM GRANULOCYTES NFR BLD: 0.9 %
LYMPHOCYTES # BLD AUTO: 1.39 X10(3) UL (ref 1–4)
LYMPHOCYTES NFR BLD AUTO: 12.3 %
MCH RBC QN AUTO: 27.7 PG (ref 26–34)
MCHC RBC AUTO-ENTMCNC: 32.4 G/DL (ref 31–37)
MCV RBC AUTO: 85.5 FL
MONOCYTES # BLD AUTO: 0.74 X10(3) UL (ref 0.1–1)
MONOCYTES NFR BLD AUTO: 6.6 %
NEUTROPHILS # BLD AUTO: 8.86 X10 (3) UL (ref 1.5–7.7)
NEUTROPHILS # BLD AUTO: 8.86 X10(3) UL (ref 1.5–7.7)
NEUTROPHILS NFR BLD AUTO: 78.7 %
PLATELET # BLD AUTO: 283 10(3)UL (ref 150–450)
RBC # BLD AUTO: 5.73 X10(6)UL
WBC # BLD AUTO: 11.3 X10(3) UL (ref 4–11)

## 2024-07-26 PROCEDURE — 85025 COMPLETE CBC W/AUTO DIFF WBC: CPT | Performed by: FAMILY MEDICINE

## 2024-07-26 NOTE — PROGRESS NOTES
Patient to clinic for labs per Dr Stokes  1 tubes drawn lavender 1 attempt  Patient left office in stable condition

## 2024-07-29 NOTE — H&P
[facility]  History & Physical    Duran Harry Jr. Patient Status:  Hospital Outpatient Surgery    1950 MRN [id number]   Location [facility] SURGERY Attending Igor Ferrer MD   Hosp Day # 0 PCP Ashish Stokes MD     Date of Admission:  (Not on file)    History of Present Illness:  Duran Harry Jr. is a(n) 73 year old male.  The patient has failed conservative treatment for left knee osteoarthritis and has significant limitations in ADL's including ambulation and exercise, and presents for total joint arthroplasty at this time.    History:  Past Medical History:    Diverticulosis    DJD (degenerative joint disease), lumbar    Fatty liver    Seen on liver u/s    H. pylori infection    Hepatic cyst    Hyperlipemia    Lung nodule    L PUILMONARY NODULE    Nephrolithiasis     Past Surgical History:   Procedure Laterality Date    Colonoscopy       Dr. Mccord, diverticulosis    Inguinal herniorrhaphy      BILAT W/REVISIONS     Family History   Problem Relation Age of Onset    Other (PSYCHIATRIC PROBLEMS [Other]) Mother     Other (CROHN'S [Other]) Father     Other (NEPHROLITIASIS [Other]) Father       reports that he has never smoked. He has never used smokeless tobacco. He reports current alcohol use. He reports that he does not use drugs.    Allergies:  Allergies   Allergen Reactions    Demerol OTHER (SEE COMMENTS)     Had red streak up arm when given I.V.       Home Medications:  No medications prior to admission.       Physical Exam:   General: Alert, orientated x3.  Cooperative.  No apparent distress.  Vital Signs:  Height 5' 9\" (1.753 m), weight 150 lb (68 kg).  HEENT: Exam is unremarkable.    Neck: No tenderness to palpitation. No JVD. Supple.   Lungs: Clear to auscultation bilaterally.  Cardiac: Regular rate and rhythm. No murmur.  Abdomen:  Soft, non-distended, non-tender, with no rebound or guarding.   Extremities:  No lower extremity edema noted.  Without clubbing or cyanosis.    The  left knee is tender at the medial and lateral joint lines, with crepitus on range of motion    Laboratory Data:  xrays show severe osteoarthritis of the left knee    Impression and Plan:  Patient Active Problem List   Diagnosis    Hyperlipidemia    GERD without esophagitis       Plan is for cemented left total knee arthroplasty        Igor Ferrer MD  7/29/2024  5:01 PM

## 2024-07-31 ENCOUNTER — ANESTHESIA (OUTPATIENT)
Dept: SURGERY | Facility: HOSPITAL | Age: 74
End: 2024-07-31
Payer: MEDICARE

## 2024-07-31 ENCOUNTER — ANESTHESIA EVENT (OUTPATIENT)
Dept: SURGERY | Facility: HOSPITAL | Age: 74
End: 2024-07-31
Payer: MEDICARE

## 2024-07-31 ENCOUNTER — HOSPITAL ENCOUNTER (OUTPATIENT)
Facility: HOSPITAL | Age: 74
Setting detail: HOSPITAL OUTPATIENT SURGERY
Discharge: HOME HEALTH | End: 2024-07-31
Attending: ORTHOPAEDIC SURGERY | Admitting: ORTHOPAEDIC SURGERY
Payer: MEDICARE

## 2024-07-31 VITALS
BODY MASS INDEX: 22.36 KG/M2 | HEART RATE: 78 BPM | WEIGHT: 151 LBS | HEIGHT: 69 IN | DIASTOLIC BLOOD PRESSURE: 89 MMHG | RESPIRATION RATE: 18 BRPM | TEMPERATURE: 98 F | SYSTOLIC BLOOD PRESSURE: 143 MMHG | OXYGEN SATURATION: 97 %

## 2024-07-31 DIAGNOSIS — Z01.818 PRE-OP TESTING: Primary | ICD-10-CM

## 2024-07-31 DIAGNOSIS — Z96.652 S/P TOTAL KNEE ARTHROPLASTY, LEFT: ICD-10-CM

## 2024-07-31 LAB — RH BLOOD TYPE: POSITIVE

## 2024-07-31 PROCEDURE — 76942 ECHO GUIDE FOR BIOPSY: CPT | Performed by: ANESTHESIOLOGY

## 2024-07-31 PROCEDURE — 88311 DECALCIFY TISSUE: CPT | Performed by: ORTHOPAEDIC SURGERY

## 2024-07-31 PROCEDURE — 97116 GAIT TRAINING THERAPY: CPT

## 2024-07-31 PROCEDURE — 88305 TISSUE EXAM BY PATHOLOGIST: CPT | Performed by: ORTHOPAEDIC SURGERY

## 2024-07-31 PROCEDURE — 0SRD0J9 REPLACEMENT OF LEFT KNEE JOINT WITH SYNTHETIC SUBSTITUTE, CEMENTED, OPEN APPROACH: ICD-10-PCS | Performed by: ORTHOPAEDIC SURGERY

## 2024-07-31 PROCEDURE — 97530 THERAPEUTIC ACTIVITIES: CPT

## 2024-07-31 PROCEDURE — 97161 PT EVAL LOW COMPLEX 20 MIN: CPT

## 2024-07-31 DEVICE — ATTUNE KNEE SYSTEM FEMORAL POSTERIOR STABILIZED SIZE 7 LEFT CEMENTED
Type: IMPLANTABLE DEVICE | Site: KNEE | Status: FUNCTIONAL
Brand: ATTUNE

## 2024-07-31 DEVICE — ATTUNE PATELLA MEDIALIZED DOME 38MM CEMENTED AOX
Type: IMPLANTABLE DEVICE | Site: KNEE | Status: FUNCTIONAL
Brand: ATTUNE

## 2024-07-31 DEVICE — SMARTSET HV HIGH VISCOSITY BONE CEMENT 40G
Type: IMPLANTABLE DEVICE | Site: KNEE | Status: FUNCTIONAL
Brand: SMARTSET

## 2024-07-31 DEVICE — ATTUNE KNEE SYSTEM TIBIAL INSERT ROTATING PLATFORM POSTERIOR STABILIZED 7 6MM AOX
Type: IMPLANTABLE DEVICE | Site: KNEE | Status: FUNCTIONAL
Brand: ATTUNE

## 2024-07-31 DEVICE — ATTUNE KNEE SYSTEM TIBIAL BASE ROTATING PLATFORM SIZE 7 CEMENTED
Type: IMPLANTABLE DEVICE | Site: KNEE | Status: FUNCTIONAL
Brand: ATTUNE

## 2024-07-31 RX ORDER — ASPIRIN 81 MG/1
81 TABLET ORAL 2 TIMES DAILY
OUTPATIENT
Start: 2024-07-31

## 2024-07-31 RX ORDER — ONDANSETRON 2 MG/ML
4 INJECTION INTRAMUSCULAR; INTRAVENOUS EVERY 6 HOURS PRN
Status: DISCONTINUED | OUTPATIENT
Start: 2024-07-31 | End: 2024-07-31

## 2024-07-31 RX ORDER — DIPHENHYDRAMINE HCL 25 MG
25 CAPSULE ORAL EVERY 4 HOURS PRN
OUTPATIENT
Start: 2024-07-31

## 2024-07-31 RX ORDER — MIDAZOLAM HYDROCHLORIDE 1 MG/ML
INJECTION INTRAMUSCULAR; INTRAVENOUS AS NEEDED
Status: DISCONTINUED | OUTPATIENT
Start: 2024-07-31 | End: 2024-07-31 | Stop reason: SURG

## 2024-07-31 RX ORDER — METOCLOPRAMIDE HYDROCHLORIDE 5 MG/ML
10 INJECTION INTRAMUSCULAR; INTRAVENOUS EVERY 8 HOURS PRN
Status: DISCONTINUED | OUTPATIENT
Start: 2024-07-31 | End: 2024-07-31

## 2024-07-31 RX ORDER — TRANEXAMIC ACID 10 MG/ML
1000 INJECTION, SOLUTION INTRAVENOUS ONCE
Status: COMPLETED | OUTPATIENT
Start: 2024-07-31 | End: 2024-07-31

## 2024-07-31 RX ORDER — ACETAMINOPHEN 500 MG
1000 TABLET ORAL 3 TIMES DAILY
Qty: 90 TABLET | Refills: 0 | Status: SHIPPED | OUTPATIENT
Start: 2024-07-31

## 2024-07-31 RX ORDER — TRANEXAMIC ACID 10 MG/ML
INJECTION, SOLUTION INTRAVENOUS
Status: COMPLETED
Start: 2024-07-31 | End: 2024-07-31

## 2024-07-31 RX ORDER — OXYCODONE HYDROCHLORIDE 5 MG/1
5 TABLET ORAL EVERY 4 HOURS PRN
Qty: 42 TABLET | Refills: 0 | Status: SHIPPED | OUTPATIENT
Start: 2024-07-31

## 2024-07-31 RX ORDER — DEXAMETHASONE SODIUM PHOSPHATE 4 MG/ML
VIAL (ML) INJECTION AS NEEDED
Status: DISCONTINUED | OUTPATIENT
Start: 2024-07-31 | End: 2024-07-31 | Stop reason: SURG

## 2024-07-31 RX ORDER — HYDROMORPHONE HYDROCHLORIDE 1 MG/ML
0.2 INJECTION, SOLUTION INTRAMUSCULAR; INTRAVENOUS; SUBCUTANEOUS EVERY 2 HOUR PRN
OUTPATIENT
Start: 2024-07-31

## 2024-07-31 RX ORDER — HYDROMORPHONE HYDROCHLORIDE 1 MG/ML
0.4 INJECTION, SOLUTION INTRAMUSCULAR; INTRAVENOUS; SUBCUTANEOUS EVERY 2 HOUR PRN
OUTPATIENT
Start: 2024-07-31

## 2024-07-31 RX ORDER — HYDROMORPHONE HYDROCHLORIDE 1 MG/ML
0.6 INJECTION, SOLUTION INTRAMUSCULAR; INTRAVENOUS; SUBCUTANEOUS EVERY 5 MIN PRN
Status: DISCONTINUED | OUTPATIENT
Start: 2024-07-31 | End: 2024-07-31

## 2024-07-31 RX ORDER — MIDAZOLAM HYDROCHLORIDE 1 MG/ML
1 INJECTION INTRAMUSCULAR; INTRAVENOUS EVERY 5 MIN PRN
Status: DISCONTINUED | OUTPATIENT
Start: 2024-07-31 | End: 2024-07-31

## 2024-07-31 RX ORDER — ASPIRIN 81 MG/1
81 TABLET ORAL 2 TIMES DAILY
Qty: 28 TABLET | Refills: 0 | Status: SHIPPED | OUTPATIENT
Start: 2024-07-31

## 2024-07-31 RX ORDER — DIPHENHYDRAMINE HYDROCHLORIDE 50 MG/ML
25 INJECTION INTRAMUSCULAR; INTRAVENOUS ONCE AS NEEDED
OUTPATIENT
Start: 2024-07-31 | End: 2024-07-31

## 2024-07-31 RX ORDER — ONDANSETRON 2 MG/ML
INJECTION INTRAMUSCULAR; INTRAVENOUS AS NEEDED
Status: DISCONTINUED | OUTPATIENT
Start: 2024-07-31 | End: 2024-07-31 | Stop reason: SURG

## 2024-07-31 RX ORDER — DIPHENHYDRAMINE HYDROCHLORIDE 50 MG/ML
12.5 INJECTION INTRAMUSCULAR; INTRAVENOUS EVERY 4 HOURS PRN
OUTPATIENT
Start: 2024-07-31

## 2024-07-31 RX ORDER — POLYETHYLENE GLYCOL 3350 17 G/17G
17 POWDER, FOR SOLUTION ORAL DAILY PRN
OUTPATIENT
Start: 2024-07-31

## 2024-07-31 RX ORDER — TRAMADOL HYDROCHLORIDE 50 MG/1
50 TABLET ORAL EVERY 12 HOURS SCHEDULED
Status: DISCONTINUED | OUTPATIENT
Start: 2024-07-31 | End: 2024-07-31

## 2024-07-31 RX ORDER — TIZANIDINE 2 MG/1
1 TABLET ORAL EVERY 6 HOURS PRN
OUTPATIENT
Start: 2024-07-31

## 2024-07-31 RX ORDER — SODIUM CHLORIDE, SODIUM LACTATE, POTASSIUM CHLORIDE, CALCIUM CHLORIDE 600; 310; 30; 20 MG/100ML; MG/100ML; MG/100ML; MG/100ML
INJECTION, SOLUTION INTRAVENOUS CONTINUOUS
Status: DISCONTINUED | OUTPATIENT
Start: 2024-07-31 | End: 2024-07-31

## 2024-07-31 RX ORDER — NALOXONE HYDROCHLORIDE 0.4 MG/ML
0.08 INJECTION, SOLUTION INTRAMUSCULAR; INTRAVENOUS; SUBCUTANEOUS AS NEEDED
Status: DISCONTINUED | OUTPATIENT
Start: 2024-07-31 | End: 2024-07-31

## 2024-07-31 RX ORDER — DEXAMETHASONE SODIUM PHOSPHATE 10 MG/ML
8 INJECTION, SOLUTION INTRAMUSCULAR; INTRAVENOUS ONCE
OUTPATIENT
Start: 2024-08-01 | End: 2024-08-01

## 2024-07-31 RX ORDER — HYDROMORPHONE HYDROCHLORIDE 1 MG/ML
0.4 INJECTION, SOLUTION INTRAMUSCULAR; INTRAVENOUS; SUBCUTANEOUS EVERY 5 MIN PRN
Status: DISCONTINUED | OUTPATIENT
Start: 2024-07-31 | End: 2024-07-31

## 2024-07-31 RX ORDER — METOCLOPRAMIDE HYDROCHLORIDE 5 MG/ML
10 INJECTION INTRAMUSCULAR; INTRAVENOUS EVERY 8 HOURS PRN
OUTPATIENT
Start: 2024-07-31

## 2024-07-31 RX ORDER — OXYCODONE HYDROCHLORIDE 5 MG/1
2.5 TABLET ORAL EVERY 4 HOURS PRN
Status: DISCONTINUED | OUTPATIENT
Start: 2024-07-31 | End: 2024-07-31

## 2024-07-31 RX ORDER — OXYCODONE HYDROCHLORIDE 5 MG/1
5 TABLET ORAL EVERY 4 HOURS PRN
Status: DISCONTINUED | OUTPATIENT
Start: 2024-07-31 | End: 2024-07-31

## 2024-07-31 RX ORDER — ACETAMINOPHEN 160 MG/5ML
650 SOLUTION ORAL
Status: CANCELLED | OUTPATIENT
Start: 2024-07-31

## 2024-07-31 RX ORDER — DOCUSATE SODIUM 100 MG/1
100 CAPSULE, LIQUID FILLED ORAL 2 TIMES DAILY
OUTPATIENT
Start: 2024-07-31

## 2024-07-31 RX ORDER — CYCLOBENZAPRINE HCL 10 MG
10 TABLET ORAL EVERY 8 HOURS PRN
OUTPATIENT
Start: 2024-07-31

## 2024-07-31 RX ORDER — ACETAMINOPHEN 325 MG/1
TABLET ORAL
Status: COMPLETED
Start: 2024-07-31 | End: 2024-07-31

## 2024-07-31 RX ORDER — HYDROMORPHONE HYDROCHLORIDE 1 MG/ML
0.2 INJECTION, SOLUTION INTRAMUSCULAR; INTRAVENOUS; SUBCUTANEOUS EVERY 5 MIN PRN
Status: DISCONTINUED | OUTPATIENT
Start: 2024-07-31 | End: 2024-07-31

## 2024-07-31 RX ORDER — SENNOSIDES 8.6 MG
17.2 TABLET ORAL NIGHTLY
OUTPATIENT
Start: 2024-07-31

## 2024-07-31 RX ORDER — ENEMA 19; 7 G/133ML; G/133ML
1 ENEMA RECTAL ONCE AS NEEDED
OUTPATIENT
Start: 2024-07-31

## 2024-07-31 RX ORDER — FERROUS SULFATE 325(65) MG
325 TABLET, DELAYED RELEASE (ENTERIC COATED) ORAL
OUTPATIENT
Start: 2024-07-31

## 2024-07-31 RX ORDER — ACETAMINOPHEN 325 MG/1
650 TABLET ORAL ONCE
Status: COMPLETED | OUTPATIENT
Start: 2024-07-31 | End: 2024-07-31

## 2024-07-31 RX ORDER — KETOROLAC TROMETHAMINE 30 MG/ML
15 INJECTION, SOLUTION INTRAMUSCULAR; INTRAVENOUS EVERY 6 HOURS
OUTPATIENT
Start: 2024-07-31 | End: 2024-08-02

## 2024-07-31 RX ORDER — ONDANSETRON 2 MG/ML
4 INJECTION INTRAMUSCULAR; INTRAVENOUS EVERY 6 HOURS PRN
OUTPATIENT
Start: 2024-07-31

## 2024-07-31 RX ORDER — ACETAMINOPHEN 500 MG
1000 TABLET ORAL ONCE
Status: DISCONTINUED | OUTPATIENT
Start: 2024-07-31 | End: 2024-07-31 | Stop reason: HOSPADM

## 2024-07-31 RX ORDER — BISACODYL 10 MG
10 SUPPOSITORY, RECTAL RECTAL
OUTPATIENT
Start: 2024-07-31

## 2024-07-31 RX ORDER — TRAMADOL HYDROCHLORIDE 50 MG/1
TABLET ORAL
Status: COMPLETED
Start: 2024-07-31 | End: 2024-07-31

## 2024-07-31 RX ADMIN — DEXAMETHASONE SODIUM PHOSPHATE 2 MG: 4 MG/ML VIAL (ML) INJECTION at 09:03:00

## 2024-07-31 RX ADMIN — SODIUM CHLORIDE, SODIUM LACTATE, POTASSIUM CHLORIDE, CALCIUM CHLORIDE: 600; 310; 30; 20 INJECTION, SOLUTION INTRAVENOUS at 08:52:00

## 2024-07-31 RX ADMIN — TRANEXAMIC ACID: 10 INJECTION, SOLUTION INTRAVENOUS at 10:02:00

## 2024-07-31 RX ADMIN — SODIUM CHLORIDE, SODIUM LACTATE, POTASSIUM CHLORIDE, CALCIUM CHLORIDE: 600; 310; 30; 20 INJECTION, SOLUTION INTRAVENOUS at 10:02:00

## 2024-07-31 RX ADMIN — DEXAMETHASONE SODIUM PHOSPHATE 4 MG: 4 MG/ML VIAL (ML) INJECTION at 09:30:00

## 2024-07-31 RX ADMIN — MIDAZOLAM HYDROCHLORIDE 2 MG: 1 INJECTION INTRAMUSCULAR; INTRAVENOUS at 08:54:00

## 2024-07-31 RX ADMIN — ONDANSETRON 4 MG: 2 INJECTION INTRAMUSCULAR; INTRAVENOUS at 09:38:00

## 2024-07-31 RX ADMIN — TRANEXAMIC ACID 1000 MG: 10 INJECTION, SOLUTION INTRAVENOUS at 09:01:00

## 2024-07-31 NOTE — DISCHARGE INSTRUCTIONS
Sometimes managing your health at home requires assistance.  The [facility] team has recognized your preference to use [facility].  They can be reached by phone at [phone].  The fax number for your reference is [phone].  A representative from the home health agency will contact you or your family to schedule your first visit.      SpandaGrip (compression sleeve) for Total KNEES    Wear the compression sleeve until first follow up visit to surgeon's office.  At first visit, check with surgeon if need to continue use.  Take off to shower.  Best to keep on as much as possible, even at night (except when washing out).  Apply second pair of SpandaGrip while washing other pair (wear continuously).  Wash with mild soap and water; Line-dry overnight.    Directions to put on and off:   For application, you will have 2 PIECES OF SpandaGrip (compression sleeves), USUALLY DIFFERENT SIZES because a calf is usually smaller than a knee.  Using the larger tube first, pull up over the knee until the top portion is mid-thigh and lower portion is mid-calf.   The 2nd piece (usually smaller piece) is pulled over and past the first sleeve up to the knee. The upper edge of the 2nd piece should overlap the top of the 1st piece by a few inches. This is the only place where the 2 different pieces overlap.  The spanda- should be flat so that it does not roll and become too tight.  Make sure it is NOT bunched up anywhere.   If the SpandaGrip feels TOO tight, then REMOVE it and call your surgeon to let them know.  Knee Replacement Discharge Instructions    Dear Patient,     [facility] cares about your progress with recovery following your joint replacement surgery.     300 days from your scheduled surgery, [facility] will send you a follow-up survey to help us understand how your surgery impacted your mobility, pain, and overall quality of life. Please make every effort  to complete this survey. The information collected from this survey will be used by your physician to track your recovery.     Sincerely,     [facility]      Activity    Bathing  No tub baths, pools, or saunas until cleared by surgeon (about 4-6 weeks because it takes that long for the incision on the skin to heal and be a barrier to prevent infection).  When allowed to shower:    IF AQUACEL - dressing is waterproof and does not require being covered to keep it dry.  Pat dressing and surrounding skin dry after shower              AQUACEL          Gauze dressings are NOT waterproof and REQUIRE being covered with a waterproof barrier to keep the dressing and the incision dry.  SARAN WRAP, GLAD WRAP, and PRESS N SEAL WORK  REALLY WELL BUT ANY PLASTIC WRAP WILL DO.   Do not wash incision.   Remove entire wrapping and old dressing (if Gauze) after showering. Pat dry if necessary WITH A CLEAN TOWEL and cover incision with dry sterile gauze and paper tape. For other types of dressings, follow surgeon’s orders.                                 GAUZE          Driving  Do not drive until cleared by surgeon. This is usually in four to six weeks after surgery. Discuss at follow-up office visit.   Not allowed while taking narcotic pain medication or muscle relaxants.    Sex  Usually allowed after four to six weeks - check with surgeon at your office visit.    Return to work  Usually allowed after four to six weeks. Discuss specific work activities with your surgeon.    Restrictions  For knee replacement surgery, follow instructions provided by physical therapy.  Do NOT put a pillow under your knee as it may be more difficult to straighten afterwards.    No smoking  Avoid smoking. It is known to cause breathing problems and can decrease the rate of healing.    Incision care/Dressing changes  Wash hands before and after dressing changes.  FOR DRY GAUZE DRESSING:  Change dressing daily using dry  sterile gauze and paper tape once Aquacel (waterproof) dressing changed (which is about 7 days after surgery). Continue this until you follow up in the office with your surgeon. Have knee bent when changing dressing for more ease of bending afterwards.  There could be a small amount of redness around the staples or incision; this is normal.  Watch for increased redness, warmth, any odor, increased drainage or opening of the incision. A little clear yellow or blood tinged drainage is normal up to 2 weeks after surgery but it should be less every day until it stops.  Call physician if you notice any concerning changes.  Sutures/staples will be removed at first office visit (10 days- 3 weeks).                          GAUZE                                                   Medication  Anticoagulants = blood thinners (Xarelto, Eliquis, Lovenox, Coumadin, or Aspirin)  Pill or shot form depending on what your physician orders.   IF placed on Coumadin, you may also need lab work done for monitoring.  You will bleed easier and bruise easier while on these medications.   Usually you will be on a blood thinner for about 2-5 weeks depending what physician orders.  Contact your physician if you have signs of bruising, nose bleeds or blood in your urine. You may consider using electric razors and soft bristle toothbrushes.  Do not take aspirin while taking blood thinners unless ordered by your physician.  Review anticoagulant education information sheet provided.    Discomfort  Surgical discomfort is normal for one to two months.  Have realistic goals and keep a positive outlook.  You may need pain medication regularly (every 4-6 hours) the first 2 weeks and then begin to decrease how often you are taking it.  Take pain medication as prescribed with food, especially before therapy, allowing 30-60 minutes to take effect.  Do not drink alcohol while on pain medication.  Keep pain manageable; pain should not disrupt your sleep or  activities like getting out of bed or walking.  As you have less discomfort, decrease the amount of pain medication you take. Use plain Tylenol (acetaminophen) for less severe pain.  Some pain medications have Tylenol (acetaminophen) in them such as Norco and Percocet. Do NOT take Tylenol (acetaminophen) within 4 hours of a dose of these medications.  Apply ice or cold therapy to surgical site for 20 minutes at least four times a day, especially after therapy. Be sure there is a thin cloth barrier between skin and ice or cold therapy.  Change position at least every 45 minutes while awake to avoid stiffness or increased discomfort.  For knee replacements- may elevate your leg by placing a pillow under entire leg. Do not place pillow only under the knee.  Have realistic goals and keep a positive outlook.  Deep breathing and relaxation techniques and distractions can help!  If you focus on something else, you do not experience the pain the same. Take advantage of everything available to your to help control you discomfort.  Contact physician if discomfort does not respond to pain medication.    Body changes  Constipation is common with the use of narcotics.  Eat fiber rich foods and drink plenty of fluids.  Use stool softeners such as Colace or Senakot while on narcotics, and laxatives such as Miralax or Milk of Magnesia if needed.   An enema or suppository may be needed if above measures do not work.    Prevention of infection and promotion of healing  Good hand washing is important. Everyone should wash their hands or use hand  as soon as they walk in your house-whether they live there or are visiting.  Keep bed linen/clothing freshly laundered.  Do not allow others or pets to touch your incision.  Avoid people that have colds or the flu.  Your surgeon may recommend that you take antibiotics before you undergo any dental or other invasive surgical procedures after your joint replacement. Speak with your  physician about this at your post-op office visit.  Eat a balanced diet high in fiber and drink plenty of fluids.   Continue using incentive spirometry because narcotics make you sleepy so you may not take good deep breaths. We do not want you to get pneumonia.     Post op Office visits  Schedule 10 days to 3 weeks after surgery WITH SURGEON’s office.  Additional visits may need to be scheduled. Your physician will discuss this at first post-op office visit.  Schedule outpatient physical therapy per your surgeon’s orders.  Schedule one week follow up after surgery WITH PRIMARY CARE PHYSICIAN; review your medications over last 6 months. Your body gets stressed by surgery and that stress can affect all your other health issues (such as high blood pressure, diabetes, CHF, afib, and asthma just to name a few).  We don’t want those other health issues to cause you to get readmitted to the hospital; much better for you to catch developing problems and prevent them from becoming larger ones.  JIM HOSE - IF ordered by your surgeon, wear these during the day and off at night.      Notify your surgeon if you notice any  of the following signs  Separation of incision line.  Increased redness, swelling, or warmth of skin around incision.  Increased or foul smelling drainage from incision  Red streaks on skin near incision.  Temperature >101 F.  Increased pain at incision not relieved by pain medication.      Signs of blood clot  Pain, excessive tenderness, redness, or swelling in leg or calf (other than incision site).  (CALL SURGEON)      Go directly to the ER or CALL 911 if  you:  become short of breath  have chest pain  cough up blood  have unexplained anxiety with breathing  Traveling and Handicapped parking  Check with you surgeon when allowed so you don’t set yourself up for greater chance of complications.  If traveling by car, get out to stretch every 2 hours.  This helps prevent stiffness.  You may need to do this any  time you travel for the first year after surgery.  If traveling by plane, BEFORE you get into a security line, let them know that you had your hip replaced, as you will most likely set off the metal detector. The doctors no longer provide an identification card for this as they are easily copied. ALSO request a wheelchair the first year to board and get off a plane…this aids in priority seating and you should sit on the aisle or at the bulkhead where you can easily stretch your legs and get up to walk up and down the aisles…this helps prevent blood clots and stiffness.  TEMPORARY HANDICAP PARKING APPLICATION  (good for 3-6 months)  - At Surgeon or PCP visit, request they fill out the form, then go to On license of [facility] (only time you do not wait in a long line there). Some [facility] offices provide the same service. ([place] and [place] have this service; if you live in another [facility], you may check with them as well). You need space to open car doors to position yourself properly with walker to get in and out of your car safely; some parking spaces are  practically on top of each other and do not give you enough room.    SPECIAL  INSTRUCTIONS:        View knee discharge education video at [email or web address] .  Choose + after surgery    Took Tramadol 50mg tab for pain at 1254pmView knee discharge education video at [email or web address]-[email or web address] .  Choose + after surgery  Using an Incentive Spirometer  An incentive spirometer is a device that helps you do deep breathing exercises after surgery. Or it helps lower the risk for breathing problems if you have a lung disease or condition. These exercises expand your lungs, aid in circulation, and may help prevent pneumonia. Deep breathing exercises also help you breathe better and improve the function of your lungs by:   Keeping your lungs clear  Strengthening your breathing muscles  Helping prevent respiratory complications or problems  The incentive spirometer  gives you a way to take an active part in your recovery. A nurse or respiratory therapist will teach you breathing exercises. To do these exercises, you will breathe in through your mouth and not your nose. The incentive spirometer only works correctly if you breathe in through your mouth.      Deep breathing expands the lungs, aids circulation, and helps prevent pneumonia.     Your healthcare provider or their staff will tell you how to use the device, your targeted volume(s), and provide other helpful tips to prevent complications (such as pain, dizziness, feeling lightheaded) when blowing in the incentive spirometer.   Steps to clear lungs  Step 1. Exhale normally. Then, inhale normally.   Relax and breathe out.  Step 2. Place your lips tightly around the mouthpiece.   Make sure the device is upright and not tilted.  Sit up and breathe out (exhale) fully  Tightly seal your lips around the mouthpiece  Step 3. Inhale as much air as you can through the mouthpiece. Don't breathe through your nose.   Breathe in (inhale) slowly and deeply.  Hold your breath long enough to keep the balls, piston, or disk raised for at least 3 to 5seconds, or as instructed by your healthcare provider.  Exhale slowly to allow the balls, piston, or disk to fall before repeating again.  Note: Some spirometers have an indicator to let you know that you are breathing in too fast. If the indicator goes off, breathe in more slowly.   Step 4. Repeat the exercise regularly.   Do sets of 10 exercises every hour while you're awake, or as instructed by your healthcare provider. Don't do more than 30 breaths in each set.  If you were taught deep breathing and coughing exercises, do them regularly as instructed by your provider, nurse, or respiratory therapist.    Follow-up care  Make a follow-up appointment, or as directed by your healthcare provider. Also follow up with your provider as advised if your symptoms don't improve or continue to get  worse.   When to call your healthcare provider   Call your healthcare provider right away if you have any of these:   Fever 100.4° (38°C) or higher, or as advised by your provider  Brownish, bloody, or smelly sputum (phlegm that you cough up)  Call 911  Call 911 if any of these occur:    Shortness of breath that doesn't get better after taking your medicine  Cool, moist, pale, or blue skin  Trouble breathing or swallowing, wheezing  Fainting or loss of consciousness  Feeling of dizziness or weakness, or a sudden drop in blood pressure  Feeling very ill  Lightheadedness  Chest pain or rapid heart rate  Blaise last reviewed this educational content on 6/1/2022  © 3100-0403 [employer]. All rights reserved. This information is not intended as a substitute for professional medical care. Always follow your healthcare professional's instructions.

## 2024-07-31 NOTE — CM/SW NOTE
07/31/24 1100   CM/SW Referral Data   Referral Source Social Work (self-referral)   Reason for Referral Discharge planning   Informant EMR;Clinical Staff Member   Discharge Needs   Anticipated D/C needs Home health care       Chart reviewed for discharge planning.  Anticipate plan for patient to discharge home today from [facility].  Pt is a 72 y/o man s/p TKR with Dr Ferrer.  Pt with pre-operative plan for HHC with [facility] ([facility] at [place].  Referral sent to HH provider via [facility] to confirm acceptance.  PT eval pending.  Await therapy recommendations for further DC planning.  / to remain available for support and/or discharge planning.     Bita Montaño [facility]  Discharge Planner  [phone]    Addendum:  Met with pt and his daughter at bedside.  [facility] information for [facility] given.  Pt agreeable with DC plan.  Pt's dtr to stay with him at DC to assist as needed.  PT eval pending. Updated RN and [facility].

## 2024-07-31 NOTE — ANESTHESIA POSTPROCEDURE EVALUATION
[facility]    Duran Harry Jr. Patient Status:  Hospital Outpatient Surgery   Age/Gender 73 year old male MRN [id number]   Location [facility] SURGERY Attending Igor Ferrer MD   Hosp Day # 0 PCP Ashish Stokes MD       Anesthesia Post-op Note    LEFT TOTAL KNEE ARTHROPLASTY    Procedure Summary       Date: 07/31/24 Room / Location:  MAIN OR 14 / [facility] MAIN OR    Anesthesia Start: 0854 Anesthesia Stop: 1002    Procedure: LEFT TOTAL KNEE ARTHROPLASTY (Left: Knee) Diagnosis: (PRIMARY OSTEOARTHRITIS OF LEFT KNEE)    Surgeons: Igor Ferrer MD Anesthesiologist: Vernon Landry MD    Anesthesia Type: MAC, spinal, regional ASA Status: 2            Anesthesia Type: MAC, spinal, regional    Vitals Value Taken Time   /47 07/31/24 1002   Temp 98.6 °F (37 °C) 07/31/24 1002   Pulse 77 07/31/24 1002   Resp 16 07/31/24 1002   SpO2 94 % 07/31/24 1002       Patient Location: PACU    Anesthesia Type: regional, MAC and spinal    Airway Patency: patent    Postop Pain Control: adequate    Mental Status: mildly sedated but able to meaningfully participate in the post-anesthesia evaluation    Nausea/Vomiting: none    Cardiopulmonary/Hydration status: stable euvolemic    Complications: no apparent anesthesia related complications    Postop vital signs: stable    Dental Exam: Unchanged from Preop

## 2024-07-31 NOTE — OPERATIVE REPORT
DATE OF PROCEDURE:  7/31/2024  PREOPERATIVE DIAGNOSIS: Left knee osteoarthritis.   POSTOPERATIVE DIAGNOSIS: Left knee osteoarthritis.   PROCEDURE PERFORMED: Cemented left total knee arthroplasty.   SURGEON:  Igor Ferrer M.D.  FIRST ASSISTANT:  Parish Haddad PA-C.   SECOND ASSISTANT:  Zoran Vásquez  ANESTHESIA: Spinal plus femoral nerve block.   INDICATIONS: The patient has severe knee osteoarthritis that has not responded to conservative treatment including antiinflammatories and injections.  The patient's pain has limited activities of daily living including ambulation and exercise.    DESCRIPTION OF PROCEDURE: The patient was brought to the operating room and under spinal anesthetic, the left lower extremity was sterilely prepped and draped.  Preoperative antibiotics had been administered.   A high thigh tourniquet was inflated to 300.  It was medically necessary for the presence of the assistants listed for safe patient care.  This included positioning of the leg, holding of retractors to protect the underlying neurovascular structures and soft tissues.  It was required for the assistants to hold retractors to protect soft tissues while the primary surgeon made the bone cuts on the femur, the tibia, and the patella.  The assistants also held the leg stable and positioned while the primary surgeon made the approach, and the bone cuts, and affixed the guides and later the components to the bones.  An anterior incision was made, medial and lateral flaps were created. A medial parapatellar arthrotomy was created. The patella was everted, the knee was flexed.  Severe arthritic changes with areas of eburnated bone were noted.  A drill was placed in the distal femur and a 6-degree 10 mm cut was made.   The [employer] rotating platform system was used.  Sizing was performed and a size 7 cutting block was selected to make anterior, posterior, chamfer and box cuts for a cruciate-sacrificing knee. Attention was turned to the  tibia. An external alignment guide was utilized to take 10 mm off the less involved lateral side. Sizing was performed and a size 7 fit well. There were equal medial and lateral gaps when checked with a spacer.  Equal flexion and extension gaps were obtained. The stem cut was made for the tibia and 10 mm was taken off the posterior patella. Sizing was performed and a size 38 patella was selected. Three drill holes were placed.   Trial was performed with a 7 femur, 7 tibia, 6 mm insert and 38 mm patella. This gave good varus and valgus stability, good flexion and extension, good tracking of the patella. Drill holes were made in the distal femoral condyles in the trial template. Trial components were removed. Bony surfaces were irrigated and dried. Final components were precoated with cement which had been mixed under vacuum conditions. The bony surfaces were precoated as well. Components were impacted into place and excess cement removed. The knee was held in extension while the cement hardened.   The tourniquet was let down, bleeders were cauterized.  Lavage was performed. The arthrotomy was closed with a barbed running suture. Subcutaneous tissue was closed after further irrigation. This was closed with inverted 2-0 Vicryl for the subcutaneous tissue and staples for the skin. An aquacell dressing plus gauze covering was applied.  A lightly compressive dressing from toe to groin was applied. Estimated blood loss was 150 mL. There were no complications. There were no specimens.   The patient was brought to the PACU in stable condition.

## 2024-07-31 NOTE — ANESTHESIA PREPROCEDURE EVALUATION
PRE-OP EVALUATION    Patient Name: Duran Harry Jr.    Admit Diagnosis: PRIMARY OSTEOARTHRITIS OF LEFT KNEE    Pre-op Diagnosis: PRIMARY OSTEOARTHRITIS OF LEFT KNEE    LEFT TOTAL KNEE ARTHROPLASTY    Anesthesia Procedure: LEFT TOTAL KNEE ARTHROPLASTY (Left)    Surgeons and Role:     * Igor Ferrer MD - Primary    Pre-op vitals reviewed.        Body mass index is 22.15 kg/m².    Current medications reviewed.  Hospital Medications:   acetaminophen (Tylenol Extra Strength) tab 1,000 mg  1,000 mg Oral Once    lactated ringers infusion   Intravenous Continuous    tranexamic acid in sodium chloride 0.7% (Cyklokapron) 1000 mg/100mL infusion premix 1,000 mg  1,000 mg Intravenous Once    ceFAZolin (Ancef) 2g in 10mL IV syringe premix  2 g Intravenous Once    clonidine/epinephrine/ropivacaine/ketorolac in 0.9% NaCl 60 mL pain cocktail syringe for knee arthroplasty   Infiltration Once (Intra-Op)       Outpatient Medications:     Medications Prior to Admission   Medication Sig Dispense Refill Last Dose    pantoprazole 40 MG Oral Tab EC Take 1 tablet (40 mg total) by mouth daily as needed. 90 tablet 3     rosuvastatin 5 MG Oral Tab Take 1 tablet (5 mg total) by mouth nightly. 90 tablet 3     Zinc 100 MG Oral Tab Take by mouth daily.       [] azithromycin (ZITHROMAX Z-BRIGETTE) 250 MG Oral Tab Take 2 tablets (500 mg total) by mouth daily for 1 day, THEN 1 tablet (250 mg total) daily for 4 days. 6 tablet 0     [] cefdinir 300 MG Oral Cap Take 1 capsule (300 mg total) by mouth 2 (two) times daily for 7 days. X 10 days 14 capsule 0     [] azithromycin (ZITHROMAX Z-BRIGETTE) 250 MG Oral Tab Take 2 tablets (500 mg total) by mouth daily for 1 day, THEN 1 tablet (250 mg total) daily for 4 days. 6 tablet 0        Allergies: Demerol      Anesthesia Evaluation    Patient summary reviewed.    Anesthetic Complications           GI/Hepatic/Renal      (+) GERD          (+) liver disease                  Cardiovascular                                                       Endo/Other                                  Pulmonary                           Neuro/Psych                                      Past Surgical History:   Procedure Laterality Date    Colonoscopy  2014     Dr. Mccord, diverticulosis    Inguinal herniorrhaphy      BILAT W/REVISIONS     Social History     Socioeconomic History    Marital status:    Occupational History    Occupation: RETIRED   Tobacco Use    Smoking status: Never    Smokeless tobacco: Never    Tobacco comments:     CURRENT NON SMOKER   Vaping Use    Vaping status: Never Used   Substance and Sexual Activity    Alcohol use: Yes     Alcohol/week: 0.0 standard drinks of alcohol     Comment: 12 beers per week    Drug use: No     History   Drug Use No     Available pre-op labs reviewed.  Lab Results   Component Value Date    WBC 11.3 (H) 07/26/2024    RBC 5.73 07/26/2024    HGB 15.9 07/26/2024    HCT 49.0 07/26/2024    MCV 85.5 07/26/2024    MCH 27.7 07/26/2024    MCHC 32.4 07/26/2024    RDW 19.9 07/26/2024    .0 07/26/2024     Lab Results   Component Value Date     07/10/2024    K 4.6 07/10/2024     07/10/2024    CO2 31.0 07/10/2024    BUN 17 07/10/2024    CREATSERUM 1.16 07/10/2024    GLU 98 07/10/2024    CA 9.8 07/10/2024     Lab Results   Component Value Date    INR 1.02 07/10/2024         Airway      Mallampati: I  Mouth opening: >3 FB  TM distance: > 6 cm  Neck ROM: full Cardiovascular    Cardiovascular exam normal.  Rhythm: regular  Rate: normal     Dental             Pulmonary    Pulmonary exam normal.                 Other findings              ASA: 2   Plan: MAC, spinal and regional  NPO status verified and patient meets guidelines.    Post-procedure pain management plan discussed with surgeon and patient.  Surgeon requests: regional block  Comment: Adductor canal block and spinal discussed with the patient. He agrees with the plan.  Plan/risks discussed  with: patient and significant other                Present on Admission:  **None**

## 2024-07-31 NOTE — ADDENDUM NOTE
Addendum  created 07/31/24 1251 by Vernon Landry MD    Clinical Note Signed, Intraprocedure Blocks edited, Intraprocedure Meds edited, SmartForm saved

## 2024-07-31 NOTE — INTERVAL H&P NOTE
Pre-op Diagnosis: PRIMARY OSTEOARTHRITIS OF LEFT KNEE    The above referenced H&P was reviewed by Igor Ferrer MD on 7/31/2024, the patient was examined and no significant changes have occurred in the patient's condition since the H&P was performed.  I discussed with the patient and/or legal representative the potential benefits, risks and side effects of this procedure; the likelihood of the patient achieving goals; and potential problems that might occur during recuperation.  I discussed reasonable alternatives to the procedure, including risks, benefits and side effects related to the alternatives and risks related to not receiving this procedure.  We will proceed with procedure as planned.

## 2024-07-31 NOTE — ANESTHESIA PROCEDURE NOTES
Spinal Block    Date/Time: 7/31/2024 8:54 AM    Performed by: Vernon Landry MD  Authorized by: Vernon Landry MD      General Information and Staff    Start Time:  7/31/2024 8:54 AM  End Time:  7/31/2024 9:00 AM  Anesthesiologist:  Vernon Landry MD  Performed by:  Anesthesiologist  Patient Location:  OR  Site identification: surface landmarks    Preanesthetic Checklist: patient identified, IV checked, risks and benefits discussed, monitors and equipment checked, pre-op evaluation, timeout performed, anesthesia consent and sterile technique used      Procedure Details    Patient Position:  Sitting  Prep: ChloraPrep    Monitoring:  Cardiac monitor, heart rate and continuous pulse ox  Approach:  Midline  Location:  L3-4  Injection Technique:  Single-shot    Needle    Needle Type:  Sprotte  Needle Gauge:  24 G  Needle Length:  3.5 in    Assessment    Sensory Level:  T8  Events: clear CSF, CSF aspirated, well tolerated and blood negative      Additional Comments

## 2024-07-31 NOTE — ANESTHESIA PROCEDURE NOTES
Regional Block    Date/Time: 7/31/2024 9:01 AM    Performed by: Vernon Landry MD  Authorized by: Vernon Landry MD      General Information and Staff    Start Time:  7/31/2024 9:01 AM  End Time:  7/31/2024 9:03 AM  Anesthesiologist:  Vernon Landry MD  Performed by:  Anesthesiologist  Patient Location:  OR    Block Placement: Pre Induction  Site Identification: real time ultrasound guided and image stored and retrievable    Block site/laterality marked before start: site marked  Reason for Block: at surgeon's request and post-op pain management    Preanesthetic Checklist: 2 patient identifers, IV checked, risks and benefits discussed, monitors and equipment checked, pre-op evaluation, timeout performed, anesthesia consent, sterile technique used, no prohibitive neurological deficits and no local skin infection at insertion site      Procedure Details    Patient Position:  Supine  Prep: ChloraPrep    Monitoring:  Cardiac monitor, continuous pulse ox and blood pressure cuff  Block Type:  Adductor canal  Laterality:  Left  Injection Technique:  Single-shot    Needle    Needle Type:  Short-bevel and echogenic  Needle Gauge:  22 G  Needle Length:  110 mm  Needle Localization:  Ultrasound guidance  Reason for Ultrasound Use: appropriate spread of the medication was noted in real time and no ultrasound evidence of intravascular and/or intraneural injection            Assessment    Injection Assessment:  Good spread noted, negative resistance, negative aspiration for heme, incremental injection and low pressure  Heart Rate Change: No    - Patient tolerated block procedure well without evidence of immediate block related complications.     Medications  7/31/2024 9:01 AM      Additional Comments    Medication:  Ropivacaine 0.5% 20mL with 1:200,000 epi   Dexamethasone 2 mg preservative free

## 2024-07-31 NOTE — PHYSICAL THERAPY NOTE
PHYSICAL THERAPY JOINT EVALUATION - INPATIENT     Room Number:  PRE [facility]/ PRE [facility] Pool  Evaluation Date: 7/31/2024  Type of Evaluation: Initial  Physician Order: PT Eval and Treat    Presenting Problem: L TKA 7/31/24  Co-Morbidities : none noted  Reason for Therapy: Mobility Dysfunction and Discharge Planning    PHYSICAL THERAPY ASSESSMENT   Patient is currently functioning below baseline with bed mobility, transfers, gait, stair negotiation, and standing prolonged periods. Prior to admission, patient's baseline is independent with all self care and mobility.   Patient is requiring contact guard assist as a result of the following impairments: decreased functional strength, pain, impaired standing balance, impaired coordination, impaired motor planning, decreased muscular endurance, medical status, and limited L knee ROM, requires KI to assist with ambulation due to poor quad control.  Physical Therapy will continue to follow for duration of hospitalization.    Pt appropriate for dc to home with use of KI when standing or ambulating. If pt remains hospitalized will see for one additional visit to evaluate mobility with and without KI. RN aware.     Patient will benefit from continued skilled PT Services at discharge to promote prior level of function and safety with additional support and return home with home health PT.    PLAN  PT Treatment Plan: Bed mobility;Endurance;Energy conservation;Patient education;Body mechanics;Gait training;Range of motion;Strengthening;Neuromuscular re-educate;Stoop training;Stair training;Transfer training;Balance training  Rehab Potential : Good  Frequency (Obs): Daily  Number of Visits to Meet Established Goals: 1    CURRENT GOALS  Goal #1  Patient is able to demonstrate supine - sit EOB @ level: supervision     Goal #2  Patient is able to demonstrate transfers Sit to/from Stand at assistance level: supervision       Goal #3    Patient is able to ambulate 150 feet with  assistive device at assistance level: supervision   Goal #4    Patient will negotiate 4 stairs/one curb w/ assistive device and supervision   Goal #5    Patient verbalizes and/or demonstrates all precautions and safety concerns independently   Goal #6      Goal Comments: Goals established on 2024      PHYSICAL THERAPY MEDICAL/SOCIAL HISTORY  History related to current admission: Patient is a 73 year old male admitted on 2024 from home for planned L TKA completed 24.     HOME SITUATION  Type of Home: House   Home Layout: Multi-level;Able to live on main level  Stairs to Enter : 2  Railing: Yes          Lives With: Alone (Dtr will stay for 10 days post surgery)  Drives: Yes  Patient Owned Equipment: Rolling walker;Cane       Prior Level of [place]: Pt typically ind with all self care and mobility. Pt denies history of falls. Pt dtr Karina is staying with him for 10 days during recovery. Following that pt has family nearby that will assist as needed.     SUBJECTIVE  Pt pleasant and cooperative.   \"I'd like to go home\"      OBJECTIVE  Precautions: Knee immobilizer (KI when ambulating or standing)  Fall Risk: High fall risk    WEIGHT BEARING RESTRICTION  Weight Bearing Restriction: L lower extremity           L Lower Extremity: Weight Bearing as Tolerated    PAIN ASSESSMENT  Ratin  Location: L knee  Management Techniques: Activity promotion;Body mechanics;Repositioning    COGNITION  Overall Cognitive Status:  WFL - within functional limits    RANGE OF MOTION AND STRENGTH ASSESSMENT  Upper extremity ROM and strength are within functional limits     Lower extremity ROM is within functional limits except for the following: L knee limited due to surgical site, grossly 10-80 deg flexion    Lower extremity strength is within functional limits except for the following:  poor L quad contraction, L SLR limited with extensor lag.      BALANCE  Static Sitting: Good  Dynamic Sitting: Good  Static Standing:  Fair -  Dynamic Standing: Poor +    ADDITIONAL TESTS                                    ACTIVITY TOLERANCE                         O2 WALK       NEUROLOGICAL FINDINGS  Neurological Findings: Sensation           Sensation: intact to light touch L LE         AM-PAC '6-Clicks' INPATIENT SHORT FORM - BASIC MOBILITY  How much difficulty does the patient currently have...  Patient Difficulty: Turning over in bed (including adjusting bedclothes, sheets and blankets)?: None   Patient Difficulty: Sitting down on and standing up from a chair with arms (e.g., wheelchair, bedside commode, etc.): A Little   Patient Difficulty: Moving from lying on back to sitting on the side of the bed?: None   How much help from another person does the patient currently need...   Help from Another: Moving to and from a bed to a chair (including a wheelchair)?: A Little   Help from Another: Need to walk in hospital room?: A Little   Help from Another: Climbing 3-5 steps with a railing?: A Little       AM-PAC Score:  Raw Score: 20   Approx Degree of Impairment: 35.83%   Standardized Score (AM-PAC Scale): 47.67   CMS Modifier (G-Code): CJ    FUNCTIONAL ABILITY STATUS  Gait Assessment   Functional Mobility/Gait Assessment  Gait Assistance: Contact guard assist  Distance (ft): 150  Assistive Device: Rolling walker (KI)  Pattern: L Decreased stance time;L Foot flat;L Flexed knee (L KI in place)  Stairs: Stairs  How Many Stairs: 3 x 3  Device: 1 Rail;Hand held assist  Assist: Contact guard assist  Pattern: Ascend and Descend  Ascend and Descend : Step to    Skilled Therapy Provided    Bed Mobility:  Rolling: NT  Supine to sit: sba   Sit to supine: sba with R LE assisting L LE into bed     Transfer Mobility:  Sit to stand: cga   Stand to sit: cga  Gait = cga with KI, cueing for sequencing.    Therapist's Comments:Pt presents supine in bed. Agreeable to PT gaby wilkins present. Pt educated on goals of session.  Pt educaton on WBAT status. Trial of  standing with significant buckling noted L LE. Pt returned to supine. RN notified.   Returned 1415: Trial of standing with mild knee buckling, and requiring UE support on walker. Pt returned to supine and  L KI applied. Trial of standing with KI with improved ability to bear weight for ambulation. Pt trained on quad contraction during weight bearing with KI in place, good return demo. Trained on sequencing with r/w during ambulation.  Pt educated on stair negotiation as noted above, pt does well with cueing for quad contraction, use of rail on left to simulate home set up. Dtr trained on assisting pt with stair negotiation as needed. Dtr given gait belt and trained on use. Upon return to room, doffing KI for training to dtr on donning/doffing brace and noted increased bleeding from incision. RN notified and present. RN awaiting reply from MD. Pt left with staff and dtr.   Pt education on TKA exercise for ankle pumps and quad set. Pt instructed to continue to wear KI until Home PT approves dc. Pt and dtr verbalizes understanding.        Exercise/Education Provided:  Bed mobility  Body mechanics  Functional activity tolerated  Gait training  Posture  ROM  Strengthening  Transfer training  Training on use of KI due to poor quad contraction in standing.     Patient End of Session: In bed;Needs met;Call light within reach;RN aware of session/findings;Bracing education provided per handout;SCDs in place;Ice applied;Family present    Patient Evaluation Complexity Level:  History Low - no personal factors and/or co-morbidities   Examination of body systems Moderate - addressing a total of 3 or more elements   Clinical Presentation Low - Stable   Clinical Decision Making Low Complexity       PT Session Time: 60 minutes  Gait Training: 15 minutes  Therapeutic Activity: 25minutes    Therapeutic Exercise: 10 minutes

## 2024-08-16 ENCOUNTER — OFFICE VISIT (OUTPATIENT)
Dept: PHYSICAL THERAPY | Age: 74
End: 2024-08-16
Attending: ORTHOPAEDIC SURGERY
Payer: MEDICARE

## 2024-08-16 DIAGNOSIS — M17.12 PRIMARY OSTEOARTHRITIS OF LEFT KNEE: ICD-10-CM

## 2024-08-16 DIAGNOSIS — Z96.652 STATUS POST TOTAL KNEE REPLACEMENT, LEFT: Primary | ICD-10-CM

## 2024-08-16 PROCEDURE — 97140 MANUAL THERAPY 1/> REGIONS: CPT

## 2024-08-16 PROCEDURE — 97161 PT EVAL LOW COMPLEX 20 MIN: CPT

## 2024-08-16 PROCEDURE — 97110 THERAPEUTIC EXERCISES: CPT

## 2024-08-16 NOTE — PROGRESS NOTES
PHYSICAL THERAPY INITIAL EVALUATION     Date of service: 8/16/2024  Dx: Status post total knee replacement, left (Z96.652), Primary osteoarthritis of left knee (M17.12)       Insurance: MEDICARE PART A&B  Insurance Limits: N/A  Visit #: 1  Authorized # of Visits: 8 recommended   POC/Auth Expiration: N/A  Authorizing Physician/Provider: Nabil     PATIENT SUMMARY     History/NISHA: \"Since last September, I've been dragging my knee around. I couldn't walk from the house to the barn. Walking impeded it quite a bit. I went to see Dr. Stokes upstairs and he referred me to Dr. Ferrer. They took x-rays and there was a lot of arthritis.\" The patient had 3 injections over the course of 9 months but they provided minimal to no relief. The patient decided to have knee replacement surgery due to lack of improvement.     The patient lives alone. He had his daughter stay with him for the first 10 days after the surgery. \"The first two days, it wasn't too bad.\" He reports that because he was feeling good, he didn't take the prescription pain medication. By day five, he had significantly increased pain. He finally succumbed and resorted to take the narcotics which helped and \"allowed me to do what I needed to do, but it plugged me up. Last Tuesday, I told myself I can handle the pain so I stopped taking everything. I stopped taking the oxy but I still take the Tylenol.I haven't had any pain pills since Tuesday at 10:00.\"     The patient was receiving home health PT after his surgery. His exercise routine included walking, squats, toe raises, bed exercises. \"I'm having some issues straightening the knee out.\" The patient reports that he has a cane at home that he used once with his home health PT, but has been predominantly using the walker. The patient reports that he is able to bend his knee to 90deg.     DOI/S: 7/31/24    Aggravating Factors: walking, standing, stairs, squatting     Alleviating Factors: icing machine, pain  meds     PMH: The patient's PMH was reviewed with the patient including allergies, medications, and surgical and medical history. Patient  has a past medical history of Diverticulosis, DJD (degenerative joint disease), lumbar (08/13/2007), Fatty liver (09/2019), H. pylori infection, Hepatic cyst (10/02/2009), Hyperlipemia (08/08/2007), Lung nodule (10/02/2009), and Nephrolithiasis (10/02/2009).     PLF/Personal Goals: golf, lives on a farm - forestry, gardening, yardwork, helps with harvesting in the fall. His home is heated by fire that he cuts himself.     OBJECTIVE:     Pain/Symptom Presentation: Numbness over the front the knee.   Pain at rest: 3-4/10  Pain at worst: 4-5/10    Outcomes Measure(s):  LEFS: 33.75% function    Activity Measures:  Sleeping: No Activity Limitation: Patient is able to sleep up to 8 hours a night.   Sitting: Mild Activity Limitation: Patient is able to sit up to 20-30 minutes before disruption due to knee pain.   Standing/Walking After Prolonged Sitting: Mild Activity Limitation: Patient's stiffness subsides within 10 steps.   Standing: Moderate Activity Limitation: Patient is able to stand up to 10-15 minutes before disruption due to knee pain.  Car Transfers: Mild Activity Limitation: Patient is with some stiffness getting in and out of the car.   Walking: Moderate Activity Limitation: Patient is able to walk up to 10 minutes before disruption due to knee pain.  Ascending Stairs: Severe Activity Limitation: Patient navigates up the stairs with step-to gait pattern, only navigating 1-2 steps right now.   Descending Stairs: Severe Activity Limitation: Patient navigates down the stairs with step-to gait pattern, only navigating 1-2 steps right now.     Inspection/Observation: Patient's incision is clean and intact.   Gait: Patient ambulates Patient demonstrates antalgic gait pattern with moderate lack of TKE on the left side. The patient occasionally WB's on the ball of his left foot,  avoiding TKE on WB on involved side.   Palpation: Patient demonstrates guarding to knee extension mobility and stretching. Diffuse swelling noted throughout his left knee.   Sensation: Patient is with numbness over the anterior aspect of his knee.     ROM:   Knee Motion PROM AROM    Right Left Right Left   Knee Extension N/A N/A 0 -17   Knee Flexion N/A N/A 110 130   *indicates activity was associated with pain    Strength/MMT:   Knee Motion Strength    Right Left   Knee Extension 5/5 3-/5   Knee Flexion 5/5 4-/5   *indicates activity was associated with pain    Hip Motion Strength    Right Left   Hip Flexion 5/5 4-/5   Hip Extension 4/5 3+/5   Hip ABD 4/5 3+/5   *indicates activity was associated with pain    ASSESSMENT:     SEBASTIEN is a 73 year old male that presents to physical therapy evaluation s/p (L) TKR 7/31/24 by Dr. Ferrer. The patient demonstrates moderate deficits in knee extension mobility, affecting his walking gait pattern, but demonstrates overall good knee flexion mobility given his post-operative timeline. The patient was educated on the time-sensitive nature for regaining his knee extension mobility and was encouraged to spend additional time at home working on this as well as walking with more knee extension at left heel strike. The patient lives on a farm and previously leg a very active lifestyle. He is hoping to help with harvesting this fall. Sebastien is currently using a walker for daily ambulation, but would like to resume walking without the need for an AD. Current functional limitations include, but are not limited to, prolonged standing, walking long distances, navigating stairs, and squatting. The patient would benefit from physical therapy to facilitate normalized knee mobility and progression in strength, endurance, stability, and balance for full return to ADL's and PLF.     Precautions/WB Status: WBAT  Education or Treatment Limitation(s): None  Rehab Potential: Good    TREATMENT:      Initial Evaluation: x 20min     Therapeutic Activities: x 3min  Patient Education: Patient was educated on anatomy and pathophysiology of current condition, rationale for physical therapy, anticipated treatment interventions, prognosis, timeline for recovery, and expected functional outcomes based on evaluative findings.     Therapeutic Exercise: x 17min  PROM: knee extension with overpressure x 5 (L)   Quad set: 2 x 10 x 5\"  SLR: 1 x 10 (L), cues for TKE   LAQ: 1 x 10 (L)   Standing DLHR: 1 x 10 - cues for TKE  Standing hip ABD: 1 x 10 (L) only  TKE into ball at wall: 1 x 10 (L)   Administered HEP: Reviewed HEP handout, exercise selection, and recommended resistance. Provided verbal and written instructions/cueing for proper technique and common errors/compensations as needed.   Patient Education: Patient was educated on the importance of compliance with consistent treatment and HEP to achieve mutually established goals. Patient verbalized understanding.     Manual Therapy: x 5min  Edema massage to left knee x 5min     Home Exercise Program: Patient was issued a HEP handout 8/16/2024 including quad set, SLR, LAQ, standing DLHR, standing hip ABD, and TKE into ball at wall.     Provider Interactions With Patient:   All patient's questions were answered and the patient denied further comments, complaints, or concerns upon departure.   Patient was issued an appt list and verbally confirmed the next appt date and time to ensure consistency with physical therapy attendance.     Charges: PT Eval Low Complexity Complexity x 1, MT x 1, Therex x 1   Total Timed Treatment: 25min       Total Treatment Time: 45min     PLAN OF CARE:      Goals:  Short-Term Goals:  Patient will improve knee extension AROM to 0deg to facilitate TKE at heel strike for normalized gait pattern. Timeframe: 6-8 visits.   Patient will improve knee flexion AROM to 120deg to allow for reciprocal stair navigation pattern for community integration.  Timeframe: 6-8 visits.  The patient will improve quad strength to facilitate walking with a SP cane for daily ambulation. Timeframe: 8 visits.  The patient will improve LE strength and endurance to facilitate standing for extended periods of time for 2 hours daily for household and community ambulation,. Timeframe: 8 visits.  Patient will improve knee strength and stability to facilitate discontinued use of AD for daily ambulation. Timeframe: 12 visits.   Long-Term Goals:  The patient will improve LE strength and endurance to facilitate walking distances of 2 mile(s) daily for household and community ambulation. Timeframe: 16 visits.  Patient will improve lower motor control to perform double-leg squat with symmetrical loading, without asymmetries, and with proper posterior chain loading to facilitate squatting and lifting ADL's. Timeframe: 16 visits.  Patient will improve LE mobility, strength, and single-leg stabilization to meet strength requirements for reciprocal stair navigation pattern with no asymmetries for ascending and descending 5 flights of stairs daily for household and community ambulation. Timeframe: 20 visits.  Patient will improve LEFS score by at least 9 points to indicate a true change in improved function for ADL's and restoring PLF. Timeframe: 20 visits.    Plan Frequency / Duration: Patient will be seen for 2x/week for 4 weeks, for a total of 8 visits, over a 90 day period. We will re-evaluate the patient at that time in order to determine functional progress, evaluate short-term goal completion, and establish an updated plan of care. Possible treatment interventions will/may include: Therapeutic Activities, Therapeutic Exercise, Neuromuscular Re-education, Manual Therapy, Home Exercise Program Instruction, Patient Education, Self-Care/Home Management, Gait Training, and Modalities as needed.     Patient/Family was advised of these findings, precautions, and treatment options and has agreed to  actively participate in planning and for this course of care.    Thank you for your referral. Please co-sign or sign and return this letter via fax as soon as possible to [phone]. If you have any questions, please contact me at Dept: [phone].    Sincerely,    X___Leisa Plascencia PT, DPT, SCS, ATC, CSCS____ Date: __8/16/2024________    Electronically signed by therapist: Leisa Augustin PT  Physician's certification required: Yes  I certify the need for these services furnished under this plan of treatment and while under my care.

## 2024-08-16 NOTE — PATIENT INSTRUCTIONS
Thank you for coming to your physical therapy appt! During your appt today you were issued a HEP handout from [email or web address] which can also be accessed using the information below. The exercises chosen are meant to supplement the treatment you received and reinforce the progress made in physical therapy. It is important to stay consistent with your exercises to help facilitate and maximize your recovery!      View at [email or web address] using code: [email or web address]

## 2024-08-20 ENCOUNTER — OFFICE VISIT (OUTPATIENT)
Dept: PHYSICAL THERAPY | Age: 74
End: 2024-08-20
Attending: ORTHOPAEDIC SURGERY
Payer: MEDICARE

## 2024-08-20 PROCEDURE — 97140 MANUAL THERAPY 1/> REGIONS: CPT

## 2024-08-20 PROCEDURE — 97110 THERAPEUTIC EXERCISES: CPT

## 2024-08-20 NOTE — PROGRESS NOTES
Date of Service: 8/20/2024  Dx: DOI/S: 7/31/24           DOS: 7/31/24  Insurance: MEDICARE PART A&B  Insurance Limits: N/A  Visit #: 2  Authorized # of Visits:  8 recommended   POC/Auth Expiration: N/A  Date of Last PN: 8/16/24 (Visit #1)  Authorizing Physician/Provider: Nabil Sandoval MD visit: N/A  Fall Risk: Standard         Precautions: None            Subjective: The patient reports that he used his cane to go to the mailbox and it took him 10 minutes. He reports that he's still a little bit shaky with the cane.\" He reports that he \"is able to most of the exercises with too much grief.\"     Objective:     Pain/Symptom Presentation:  Resting pain pre-tx: 3-4/10  Pain at worst: 5-6/10    ROM:          Knee Motion PROM AROM    Right Left Right Left   Knee Extension N/A N/A 0 -12   Knee Flexion N/A N/A 30 104   *indicates activity was associated with pain    HEP: Patient was issued a HEP handout 8/16/2024 including quad set, SLR, LAQ, standing DLHR, standing hip ABD, and TKE into ball at wall.     Treatment:    Therapeutic Exercise: x 20min  PROM: knee extension with overpressure x 5 (L)   Quad set: 2 x 10 x 5\" - one towel behind knee   SLR: 1 x 10 (L), cues for TKE   Heel slide with strap: 1 x 10 (L)   DL bridging: 1 x 10   LAQ: 1 x 10 (L)   Standing DLHR: 1 x 10 - cues for TKE  Standing hip ABD: 1 x 10 (L) only  Standing hip ext: 1 x 10 (L) only  Standing hamstring curl: 1 x 10 (L)   Squat: 1 x 10   TKE into ball at wall: 1 x 10 (L)     Neuromuscular Re-education: x 5min  Narrow ARMAND balance: x 30\"  Narrow ARMAND balance on airex: x 30\"  Staggered stance balance: x 20\" (B)     Manual Therapy: x 15min  Edema massage to left knee x 5min   Soft tissue mobilization: (L) distal quad, ITB/VL, hamstrings     Provider Interactions With Patient:   Added therapeutic exercises as documented, with cueing provided throughout performance to ensure correct technique during exercise.  Verbally confirmed the patient's next appt date  and time to ensure consistency with physical therapy attendance.     Assessment: Duran shows slight improvement in knee extension mobility, reaching -12deg knee extension, improve from -17deg last session. The patient admits that he hasn't been doing the quad set exercise, which we was educated is important to achieve full knee mobility. Advised patient to focus on heel strike during walking to facilitate more repetitions at end-range knee extension throughout the day. The patient was also advised to avoid ER his hip when lying supine to allow gravity to stretch out his knee. Instructed the patient to use the cane when possible and as safety allows to facilitate progression to lesser restrictive device. The patient would benefit from continued PT for further progression in knee mobility and strength for improved tolerance to ADL's.     Goals:   Short-Term Goals:  Patient will improve knee extension AROM to 0deg to facilitate TKE at heel strike for normalized gait pattern. Timeframe: 6-8 visits.   Patient will improve knee flexion AROM to 120deg to allow for reciprocal stair navigation pattern for community integration. Timeframe: 6-8 visits.  The patient will improve quad strength to facilitate walking with a SP cane for daily ambulation. Timeframe: 8 visits.  The patient will improve LE strength and endurance to facilitate standing for extended periods of time for 2 hours daily for household and community ambulation,. Timeframe: 8 visits.  Patient will improve knee strength and stability to facilitate discontinued use of AD for daily ambulation. Timeframe: 12 visits.   Long-Term Goals:  The patient will improve LE strength and endurance to facilitate walking distances of 2 mile(s) daily for household and community ambulation. Timeframe: 16 visits.  Patient will improve lower motor control to perform double-leg squat with symmetrical loading, without asymmetries, and with proper posterior chain loading to  facilitate squatting and lifting ADL's. Timeframe: 16 visits.  Patient will improve LE mobility, strength, and single-leg stabilization to meet strength requirements for reciprocal stair navigation pattern with no asymmetries for ascending and descending 5 flights of stairs daily for household and community ambulation. Timeframe: 20 visits.  Patient will improve LEFS score by at least 9 points to indicate a true change in improved function for ADL's and restoring PLF. Timeframe: 20 visits.    Plan: Continue to work on knee extension mobility.       Charges: MT x 1, Therex x 2         Total Timed Treatment: 40min    Total Treatment Time: 40min

## 2024-08-21 NOTE — PROGRESS NOTES
Date of Service: 8/23/2024  Dx: DOI/S: 7/31/24           DOS: 7/31/24  Insurance: MEDICARE PART A&B  Insurance Limits: N/A  Visit #: 3  Authorized # of Visits:  8 recommended   POC/Auth Expiration: N/A  Date of Last PN: 8/16/24 (Visit #1)  Authorizing Physician/Provider: Nabil Sandoval MD visit: N/A  Fall Risk: Standard         Precautions: None            Subjective: Patient reports         The patient reports that he used his cane to go to the mailbox and it took him 10 minutes. He reports that he's still a little bit shaky with the cane.\" He reports that he \"is able to most of the exercises with too much grief.\"     Objective:     Pain/Symptom Presentation:  Resting pain pre-tx: 3-4/10  Pain at worst: 5-6/10    ROM:          Knee Motion PROM AROM    Right Left Right Left   Knee Extension N/A N/A 0 -12   Knee Flexion N/A N/A 30 104   *indicates activity was associated with pain    HEP: Patient was issued a HEP handout 8/16/2024 including quad set, SLR, LAQ, standing DLHR, standing hip ABD, and TKE into ball at wall.     Treatment:    Therapeutic Exercise: x 28 min    Quad set: 2 x 10 x 5\" - one towel behind knee   SLR: 1 x 10 (L), cues for TKE   DL bridging: 2 x 10   Heel slide with strap: 1 x 10 (L)   LAQ: 1 x 10 (L)   Standing DLHR: 1 x 10 - cues for TKE  Squat: 1 x 10   Standing hip ABD: 1 x 10 (L) only  Standing hip ext: 1 x 10 (L) only  Standing hamstring curl: 1 x 10 (L)   TKE into ball at wall: 1 x 10 (L)     Manual Therapy: x 15 min  Edema massage to left knee x 5 min   Soft tissue mobilization: (L) distal quad, ITB/VL, hamstrings   Cupping: VMO, ITBand     Provider Interactions With Patient:   Added therapeutic exercises as documented, with cueing provided throughout performance to ensure correct technique during exercise.  Verbally confirmed the patient's next appt date and time to ensure consistency with physical therapy attendance.     Assessment: Patient care this day focused on continued posterior  knee tissue mobility, and followed by strengthening exercises into knee extension. Patient still significantly limited in knee extension at rest and actively at this time, would benefit from continued care to progress ROM as tolerated. Patient educated on proper techniques to elevate ankle/knee above heart, perform multiple times throughout the day for swelling control, and to progress walking tolerance per day at home in addition to HEP.       Goals:   Short-Term Goals:  Patient will improve knee extension AROM to 0deg to facilitate TKE at heel strike for normalized gait pattern. Timeframe: 6-8 visits.   Patient will improve knee flexion AROM to 120deg to allow for reciprocal stair navigation pattern for community integration. Timeframe: 6-8 visits.  The patient will improve quad strength to facilitate walking with a SP cane for daily ambulation. Timeframe: 8 visits.  The patient will improve LE strength and endurance to facilitate standing for extended periods of time for 2 hours daily for household and community ambulation,. Timeframe: 8 visits.  Patient will improve knee strength and stability to facilitate discontinued use of AD for daily ambulation. Timeframe: 12 visits.   Long-Term Goals:  The patient will improve LE strength and endurance to facilitate walking distances of 2 mile(s) daily for household and community ambulation. Timeframe: 16 visits.  Patient will improve lower motor control to perform double-leg squat with symmetrical loading, without asymmetries, and with proper posterior chain loading to facilitate squatting and lifting ADL's. Timeframe: 16 visits.  Patient will improve LE mobility, strength, and single-leg stabilization to meet strength requirements for reciprocal stair navigation pattern with no asymmetries for ascending and descending 5 flights of stairs daily for household and community ambulation. Timeframe: 20 visits.  Patient will improve LEFS score by at least 9 points to indicate  a true change in improved function for ADL's and restoring PLF. Timeframe: 20 visits.    Plan: Continue to work on knee extension mobility.       Charges: MT x 1, Therex x 2         Total Timed Treatment: 43 min    Total Treatment Time: 43 min

## 2024-08-23 ENCOUNTER — OFFICE VISIT (OUTPATIENT)
Dept: PHYSICAL THERAPY | Age: 74
End: 2024-08-23
Attending: ORTHOPAEDIC SURGERY
Payer: MEDICARE

## 2024-08-23 PROCEDURE — 97110 THERAPEUTIC EXERCISES: CPT

## 2024-08-23 PROCEDURE — 97140 MANUAL THERAPY 1/> REGIONS: CPT

## 2024-08-23 NOTE — PROGRESS NOTES
Date of Service: 8/26/2024  Dx: DOI/S: 7/31/24           DOS: 7/31/24  Insurance: MEDICARE PART A&B  Insurance Limits: N/A  Visit #: 4  Authorized # of Visits:  8 recommended   POC/Auth Expiration: N/A  Date of Last PN: 8/16/24 (Visit #1)  Authorizing Physician/Provider: Nabil Sandoval MD visit: N/A  Fall Risk: Standard         Precautions: None            Subjective: Patient reports he feels like his mobility improves after each session but by the next day he is stiff and limited again. Patient reports she continues to use his walker around the house at times since its easier, but has been trying to put more weight through the surgical leg.      Objective: 08/26-- AROM Knee Extension: -11 deg     AROM Knee Flexion: 108 deg     Pain/Symptom Presentation:  Resting pain pre-tx: 2-3/10  Pain at worst: 4/10    ROM:          Knee Motion PROM AROM    Right Left Right Left   Knee Extension N/A N/A 0 -12   Knee Flexion N/A N/A 30 104   *indicates activity was associated with pain    HEP: Patient was issued a HEP handout 8/16/2024 including quad set, SLR, LAQ, standing DLHR, standing hip ABD, and TKE into ball at wall.     Treatment:    Therapeutic Exercise: x 25 min    Quad set: 2 x 10 x 5\" - one towel behind knee   SLR: 1 x 10 (L), cues for TKE   Added H/L Hip Adduction with Ball x 10 3\" H   DL bridging: 2 x 10   Heel slide with strap: 1 x 10 (L)   HS Mob w Ball x 10   LAQ x 10     Manual Therapy: x 20 min    Edema massage to left knee x 5 min   Patellar Distraction with Glides all 4 Dir G2-3   Soft tissue mobilization: (L) distal quad, ITB/VL, hamstrings     Provider Interactions With Patient:   Added therapeutic exercises as documented, with cueing provided throughout performance to ensure correct technique during exercise.  Verbally confirmed the patient's next appt date and time to ensure consistency with physical therapy attendance.     Assessment: Patient care this day continued to focus on posterior knee tissue  mobility, but also addressed limitations in knee ROM flexion/extension with patellar distraction in combination with glides in all 4 directions. Patient did demonstrate improved patellar mobility following, was able to get patient to -10 degrees of AROM knee extension this day. Patient also demonstrates max of knee flexion 108 degrees at this time, and is able to get almost neutral PF on table. Patient would benefit from continued focus of knee extension, added HS self mob this day that resulted in better symptoms by end of session.     Goals:   Short-Term Goals:  Patient will improve knee extension AROM to 0deg to facilitate TKE at heel strike for normalized gait pattern. Timeframe: 6-8 visits.   Patient will improve knee flexion AROM to 120deg to allow for reciprocal stair navigation pattern for community integration. Timeframe: 6-8 visits.  The patient will improve quad strength to facilitate walking with a SP cane for daily ambulation. Timeframe: 8 visits.  The patient will improve LE strength and endurance to facilitate standing for extended periods of time for 2 hours daily for household and community ambulation,. Timeframe: 8 visits.  Patient will improve knee strength and stability to facilitate discontinued use of AD for daily ambulation. Timeframe: 12 visits.   Long-Term Goals:  The patient will improve LE strength and endurance to facilitate walking distances of 2 mile(s) daily for household and community ambulation. Timeframe: 16 visits.  Patient will improve lower motor control to perform double-leg squat with symmetrical loading, without asymmetries, and with proper posterior chain loading to facilitate squatting and lifting ADL's. Timeframe: 16 visits.  Patient will improve LE mobility, strength, and single-leg stabilization to meet strength requirements for reciprocal stair navigation pattern with no asymmetries for ascending and descending 5 flights of stairs daily for household and community  ambulation. Timeframe: 20 visits.  Patient will improve LEFS score by at least 9 points to indicate a true change in improved function for ADL's and restoring PLF. Timeframe: 20 visits.    Plan: Continue to work on knee extension mobility.       Charges: MT x 1, Therex x 2         Total Timed Treatment: 45 min    Total Treatment Time: 45 min

## 2024-08-26 ENCOUNTER — OFFICE VISIT (OUTPATIENT)
Dept: PHYSICAL THERAPY | Age: 74
End: 2024-08-26
Attending: ORTHOPAEDIC SURGERY
Payer: MEDICARE

## 2024-08-26 PROCEDURE — 97140 MANUAL THERAPY 1/> REGIONS: CPT

## 2024-08-26 PROCEDURE — 97110 THERAPEUTIC EXERCISES: CPT

## 2024-08-28 ENCOUNTER — APPOINTMENT (OUTPATIENT)
Dept: PHYSICAL THERAPY | Age: 74
End: 2024-08-28
Attending: ORTHOPAEDIC SURGERY
Payer: MEDICARE

## 2024-08-30 NOTE — PROGRESS NOTES
Date of Service: 09/03/2024  Dx: DOI/S: 7/31/24           DOS: 7/31/24  Insurance: MEDICARE PART A&B  Insurance Limits: N/A  Visit #: 5  Authorized # of Visits:  8 recommended   POC/Auth Expiration: N/A  Date of Last PN: 8/16/24 (Visit #1)  Authorizing Physician/Provider: Nabil Sandoval MD visit: N/A  Fall Risk: Standard         Precautions: None            Subjective: Patient reports since having his second visit cancelled last week he took it upon himself to do some more walking, patient reports he has continued to try and use the cane more often, but is able to walk faster and carry more things walking with the walker. Patient reports he's using each AD about 50% of the time.       Objective: 08/26-- AROM Knee Extension: -11 deg     AROM Knee Flexion: 108 deg     Pain/Symptom Presentation:  Resting pain pre-tx: 2-3/10  Pain at worst: 4/10    ROM:          Knee Motion PROM AROM    Right Left Right Left   Knee Extension N/A N/A 0 -12   Knee Flexion N/A N/A 30 104   *indicates activity was associated with pain    HEP: Patient was issued a HEP handout 8/16/2024 including quad set, SLR, LAQ, standing DLHR, standing hip ABD, and TKE into ball at wall.     Treatment:    Ther Act: 10'  Added Shuttle Press DL 2 x 10 4 Cords     Therapeutic Exercise: x 15 min    Quad set: 2 x 10 x 5\" - one towel behind knee   SLR: 1 x 10 (L), cues for TKE   H/L Hip Adduction with Ball x 10 3\" H     Manual Therapy: x 20 min    Edema massage to left knee x 5 min   Patellar Distraction with Glides all 4 Dir G2-3   Soft tissue mobilization: (L) distal quad, ITB/VL, hamstrings     Provider Interactions With Patient:   Added therapeutic exercises as documented, with cueing provided throughout performance to ensure correct technique during exercise.  Verbally confirmed the patient's next appt date and time to ensure consistency with physical therapy attendance.     Assessment: Patient continues to progress with walking tolerance per subjective  reports, still demonstrating about 108 degrees AROM knee flexion, and -11 degrees knee extension. Patient continues to benefit from patellar distraction to improve knee ROM tolerances and improve comfort with glides superior/inferior, as well as posterior hands on manual to left leg. Progressed closed chain knee flexion with low resistance shuttle press this day, ended session with 115 AROM knee flexion with this addition.     Goals:   Short-Term Goals:  Patient will improve knee extension AROM to 0deg to facilitate TKE at heel strike for normalized gait pattern. Timeframe: 6-8 visits.   Patient will improve knee flexion AROM to 120deg to allow for reciprocal stair navigation pattern for community integration. Timeframe: 6-8 visits.  The patient will improve quad strength to facilitate walking with a SP cane for daily ambulation. Timeframe: 8 visits.  The patient will improve LE strength and endurance to facilitate standing for extended periods of time for 2 hours daily for household and community ambulation,. Timeframe: 8 visits.  Patient will improve knee strength and stability to facilitate discontinued use of AD for daily ambulation. Timeframe: 12 visits.   Long-Term Goals:  The patient will improve LE strength and endurance to facilitate walking distances of 2 mile(s) daily for household and community ambulation. Timeframe: 16 visits.  Patient will improve lower motor control to perform double-leg squat with symmetrical loading, without asymmetries, and with proper posterior chain loading to facilitate squatting and lifting ADL's. Timeframe: 16 visits.  Patient will improve LE mobility, strength, and single-leg stabilization to meet strength requirements for reciprocal stair navigation pattern with no asymmetries for ascending and descending 5 flights of stairs daily for household and community ambulation. Timeframe: 20 visits.  Patient will improve LEFS score by at least 9 points to indicate a true change in  improved function for ADL's and restoring PLF. Timeframe: 20 visits.    Plan: Continue to work on knee extension mobility.       Charges: MT x 1, Therex x 1, Ther Act x 1         Total Timed Treatment: 45 min    Total Treatment Time: 45 min

## 2024-09-03 ENCOUNTER — OFFICE VISIT (OUTPATIENT)
Dept: PHYSICAL THERAPY | Age: 74
End: 2024-09-03
Attending: ORTHOPAEDIC SURGERY
Payer: MEDICARE

## 2024-09-03 PROCEDURE — 97110 THERAPEUTIC EXERCISES: CPT

## 2024-09-03 PROCEDURE — 97530 THERAPEUTIC ACTIVITIES: CPT

## 2024-09-03 PROCEDURE — 97140 MANUAL THERAPY 1/> REGIONS: CPT

## 2024-09-03 NOTE — PROGRESS NOTES
Date of Service: 09/05/2024  Dx: DOI/S: 7/31/24           DOS: 7/31/24  Insurance: MEDICARE PART A&B  Insurance Limits: N/A  Visit #: 6  Authorized # of Visits:  8 recommended   POC/Auth Expiration: N/A  Date of Last PN: 8/16/24 (Visit #1)  Authorizing Physician/Provider: Nabil Sandoval MD visit: N/A  Fall Risk: Standard         Precautions: None            Subjective: Patient reports manual is less painful than it has been in previous visits, but still sensitive    Objective: 08/26-- AROM Knee Extension: -11 deg     AROM Knee Flexion: 108 deg     Pain/Symptom Presentation:  Resting pain pre-tx: 2-3/10  Pain at worst: 4/10    ROM:          Knee Motion PROM AROM    Right Left Right Left   Knee Extension N/A N/A 0 -12   Knee Flexion N/A N/A 30 104   *indicates activity was associated with pain    HEP: Patient was issued a HEP handout 8/16/2024 including quad set, SLR, LAQ, standing DLHR, standing hip ABD, and TKE into ball at wall.     Treatment:    Ther Act: 10'  Added Shuttle Press DL 2 x 10 4 Cords     Therapeutic Exercise: x 20 min    Upright Bike 10'   Quad set: 2 x 10 x 5\" - one towel behind knee   SLR: 1 x 10 (L), cues for TKE   Added Standing Elevated Adductor Stretch 2 x 20\" ea R/L     Manual Therapy: x 13 min    Edema massage to left knee x 5 min   Patellar Distraction with Glides all 4 Dir G2-3   Soft tissue mobilization: (L) Quads, Gastroc, Adductors   Cupping: Distal Adductors and HS insertion    Provider Interactions With Patient:   Added therapeutic exercises as documented, with cueing provided throughout performance to ensure correct technique during exercise.  Verbally confirmed the patient's next appt date and time to ensure consistency with physical therapy attendance.     Assessment: Patient care this day continued to focus on range of motion progressions, began upright bike for warm up prior to manual this day, would benefit from continuing this with reduced height to progress knee flexion.  Patient also advised to continue elevation at home for swelling reduction, but with more focus on knee extension progressions. Added adductor stretch after cupping to medial HS and adductor insertions this day, and advised patient to continue this exercise at home to further improve knee extension. Plan to begin single leg shuttle press next visit    Goals:   Short-Term Goals:  Patient will improve knee extension AROM to 0deg to facilitate TKE at heel strike for normalized gait pattern. Timeframe: 6-8 visits.   Patient will improve knee flexion AROM to 120deg to allow for reciprocal stair navigation pattern for community integration. Timeframe: 6-8 visits.  The patient will improve quad strength to facilitate walking with a SP cane for daily ambulation. Timeframe: 8 visits.  The patient will improve LE strength and endurance to facilitate standing for extended periods of time for 2 hours daily for household and community ambulation,. Timeframe: 8 visits.  Patient will improve knee strength and stability to facilitate discontinued use of AD for daily ambulation. Timeframe: 12 visits.   Long-Term Goals:  The patient will improve LE strength and endurance to facilitate walking distances of 2 mile(s) daily for household and community ambulation. Timeframe: 16 visits.  Patient will improve lower motor control to perform double-leg squat with symmetrical loading, without asymmetries, and with proper posterior chain loading to facilitate squatting and lifting ADL's. Timeframe: 16 visits.  Patient will improve LE mobility, strength, and single-leg stabilization to meet strength requirements for reciprocal stair navigation pattern with no asymmetries for ascending and descending 5 flights of stairs daily for household and community ambulation. Timeframe: 20 visits.  Patient will improve LEFS score by at least 9 points to indicate a true change in improved function for ADL's and restoring PLF. Timeframe: 20 visits.    Plan:  Continue to work on knee extension mobility.       Charges: MT x 1, Therex x 1, Ther Act x 1         Total Timed Treatment: 43 min    Total Treatment Time: 43 min

## 2024-09-05 ENCOUNTER — OFFICE VISIT (OUTPATIENT)
Dept: PHYSICAL THERAPY | Age: 74
End: 2024-09-05
Attending: ORTHOPAEDIC SURGERY
Payer: MEDICARE

## 2024-09-05 PROCEDURE — 97140 MANUAL THERAPY 1/> REGIONS: CPT

## 2024-09-05 PROCEDURE — 97110 THERAPEUTIC EXERCISES: CPT

## 2024-09-05 PROCEDURE — 97530 THERAPEUTIC ACTIVITIES: CPT

## 2024-09-05 NOTE — PROGRESS NOTES
Date of Service: 09/09/2024  Dx: DOI/S: 7/31/24           DOS: 7/31/24  Insurance: MEDICARE PART A&B  Insurance Limits: N/A  Visit #: 7  Authorized # of Visits: 8 recommended   POC/Auth Expiration: N/A  Date of Last PN: 8/16/24 (Visit #1)  Authorizing Physician/Provider: Nabil Sandoval MD visit: N/A  Fall Risk: Standard         Precautions: None            Subjective: Patient reports his quads/upper thigh were really sore after last visit up to 2 days, still a little tender today but more tolerable. Patient states he has been icing 1-2x/day minimum, typically slightly above knee but not directly on knee.     Objective: 09/09 -- PROM Knee Extension: -5 deg     AROM Knee Flexion: 108 deg     Pain/Symptom Presentation:  Resting pain pre-tx: 2-3/10  Pain at worst: 4/10    ROM:          Knee Motion PROM AROM    Right Left Right Left   Knee Extension N/A N/A 0 -5 (09/09)   Knee Flexion N/A N/A 30 104   *indicates activity was associated with pain    HEP: Patient was issued a HEP handout 8/16/2024 including quad set, SLR, LAQ, standing DLHR, standing hip ABD, and TKE into ball at wall.     Treatment:    Ther Act: 12'  Added Mini Squat x 10   Added Standing TKE GrTB x 15 5\"/5\"/5\"    Neuro: 12'  Added Tandem Stance 2 x 30\"   Added Standing Alternating Marches 1'    Therapeutic Exercise: x 15 min  Upright Bike 5'   Quad set: x 20 x 5\" - one towel behind heel    Manual Therapy: x 15 min  Edema massage to left knee x 5 min   Soft tissue mobilization: (L) Quads, Gastroc, Adductors   Cupping: Distal Adductors and HS insertion, Posterior Knee, Quads, ITBand     Provider Interactions With Patient:   Added therapeutic exercises as documented, with cueing provided throughout performance to ensure correct technique during exercise.  Verbally confirmed the patient's next appt date and time to ensure consistency with physical therapy attendance.     Assessment: Patient continues to benefit from manual interventions, was progressed with  some closed chain activity this day including balance in tandem, standing alternating marches, and began mini squats with focus on equal WB. Patient was able to reach max of -5 knee extension with PROM/OP this day compared to -12 few visits. Patient would benefit from continued end range knee extension strengthening and posterior leg tissue mobility to improve gait patterns and progress out of cane. Patient educated on extensive time for recovery if ROM limitations continue at this pace, as strength in new ranges will also be prolonged. Plan to continue more balance and end range knee extension. Trial manual in prone next visit with contract/relax quads and HS    Goals:   Short-Term Goals:  Patient will improve knee extension AROM to 0deg to facilitate TKE at heel strike for normalized gait pattern. Timeframe: 6-8 visits.   Patient will improve knee flexion AROM to 120deg to allow for reciprocal stair navigation pattern for community integration. Timeframe: 6-8 visits.  The patient will improve quad strength to facilitate walking with a SP cane for daily ambulation. Timeframe: 8 visits.  The patient will improve LE strength and endurance to facilitate standing for extended periods of time for 2 hours daily for household and community ambulation,. Timeframe: 8 visits.  Patient will improve knee strength and stability to facilitate discontinued use of AD for daily ambulation. Timeframe: 12 visits.   Long-Term Goals:  The patient will improve LE strength and endurance to facilitate walking distances of 2 mile(s) daily for household and community ambulation. Timeframe: 16 visits.  Patient will improve lower motor control to perform double-leg squat with symmetrical loading, without asymmetries, and with proper posterior chain loading to facilitate squatting and lifting ADL's. Timeframe: 16 visits.  Patient will improve LE mobility, strength, and single-leg stabilization to meet strength requirements for reciprocal stair  navigation pattern with no asymmetries for ascending and descending 5 flights of stairs daily for household and community ambulation. Timeframe: 20 visits.  Patient will improve LEFS score by at least 9 points to indicate a true change in improved function for ADL's and restoring PLF. Timeframe: 20 visits.    Plan: Continue to work on knee extension mobility.       Charges: MT x 1, Therex x 1, Ther Act x 1, Neuro x 1         Total Timed Treatment: 54 min    Total Treatment Time: 54 min

## 2024-09-09 ENCOUNTER — OFFICE VISIT (OUTPATIENT)
Dept: PHYSICAL THERAPY | Age: 74
End: 2024-09-09
Attending: ORTHOPAEDIC SURGERY
Payer: MEDICARE

## 2024-09-09 PROCEDURE — 97140 MANUAL THERAPY 1/> REGIONS: CPT

## 2024-09-09 PROCEDURE — 97112 NEUROMUSCULAR REEDUCATION: CPT

## 2024-09-09 PROCEDURE — 97530 THERAPEUTIC ACTIVITIES: CPT

## 2024-09-09 PROCEDURE — 97110 THERAPEUTIC EXERCISES: CPT

## 2024-09-11 ENCOUNTER — APPOINTMENT (OUTPATIENT)
Dept: PHYSICAL THERAPY | Age: 74
End: 2024-09-11
Attending: ORTHOPAEDIC SURGERY
Payer: MEDICARE

## 2024-09-12 ENCOUNTER — OFFICE VISIT (OUTPATIENT)
Dept: PHYSICAL THERAPY | Age: 74
End: 2024-09-12
Attending: ORTHOPAEDIC SURGERY
Payer: MEDICARE

## 2024-09-12 PROCEDURE — 97110 THERAPEUTIC EXERCISES: CPT

## 2024-09-12 PROCEDURE — 97140 MANUAL THERAPY 1/> REGIONS: CPT

## 2024-09-12 PROCEDURE — 97530 THERAPEUTIC ACTIVITIES: CPT

## 2024-09-12 NOTE — PROGRESS NOTES
Date of Service: 09/09/2024  Dx: DOI/S: 7/31/24           DOS: 7/31/24  Insurance: MEDICARE PART A&B  Insurance Limits: N/A  Visit #: 7  Authorized # of Visits: 8 recommended   POC/Auth Expiration: N/A  Date of Last PN: 8/16/24 (Visit #1)  Authorizing Physician/Provider: Nabil Sandoval MD visit: N/A  Fall Risk: Standard         Precautions: None            Subjective: Patient reports that he had a hard time after last visit. Feels the cupping and manual therapy was too much.   Pain/Symptom Presentation:  Resting pain pre-tx: 2-3/10  Pain at worst: 4/10    Objective:   9/12 L knee extension: -16 pre treatment, -10 post treatment, -5 with OP           L knee flexion: 105 pre-treatment, 116 post-treatment       HEP: Access Code: [email or web address]  URL: [email or web address]  Date: 09/12/2024  Prepared by: Maricruz Mejia    Exercises  - Long Sitting Quad Set with Towel Roll Under Heel  - 1 x daily - 7 x weekly - 20 reps - 5\" hold  - Standing Quad Set  - 1 x daily - 7 x weekly - 20 reps - 5\" hold  - Heel Raises with Counter Support  - 1 x daily - 7 x weekly - 20 reps  - Heel Toe Raises with Counter Support  - 1 x daily - 7 x weekly - 20 reps  - Mini Squat with Counter Support  - 1 x daily - 7 x weekly - 2 sets - 10 reps  - Gastroc Stretch with Foot at Wall  - 1 x daily - 7 x weekly - 3 sets - 30\" hold  - Seated Hamstring Stretch  - 1 x daily - 7 x weekly - 3 sets - 30\" hold  - Seated Passive Knee Extension with Weight  - 1 x daily - 7 x weekly - 3-5 min  hold    Treatment:    Ther Act: 8'  Mini Squat 2 x 10   Standing TKE GrTB x 15 5\"/5\"/5\"      Therapeutic Exercise: x 25 min   Upright Bike 5'   PROM with OP L knee flex/ext   Heel slides, 15x5\"   Quad set: x 15 x 5\" - one towel behind heel  Seated HS stretch, 2x30\"   Calf stretch, 2x30\"    Manual Therapy: x 10 min  Edema massage to left knee   Soft tissue mobilization: (L) Quads, Gastroc, Adductors       Assessment: Due to increased pain after last visit and  patient note that he thought the cupping was too much last time, held this form of manual intervention. Educated on expected outcomes regarding ROM. Progressed upright exercises focusing on improving knee extension and updated HEP to reflect this change.     Goals:   Short-Term Goals:  Patient will improve knee extension AROM to 0deg to facilitate TKE at heel strike for normalized gait pattern. Timeframe: 6-8 visits.   Patient will improve knee flexion AROM to 120deg to allow for reciprocal stair navigation pattern for community integration. Timeframe: 6-8 visits.  The patient will improve quad strength to facilitate walking with a SP cane for daily ambulation. Timeframe: 8 visits.  The patient will improve LE strength and endurance to facilitate standing for extended periods of time for 2 hours daily for household and community ambulation,. Timeframe: 8 visits.  Patient will improve knee strength and stability to facilitate discontinued use of AD for daily ambulation. Timeframe: 12 visits.   Long-Term Goals:  The patient will improve LE strength and endurance to facilitate walking distances of 2 mile(s) daily for household and community ambulation. Timeframe: 16 visits.  Patient will improve lower motor control to perform double-leg squat with symmetrical loading, without asymmetries, and with proper posterior chain loading to facilitate squatting and lifting ADL's. Timeframe: 16 visits.  Patient will improve LE mobility, strength, and single-leg stabilization to meet strength requirements for reciprocal stair navigation pattern with no asymmetries for ascending and descending 5 flights of stairs daily for household and community ambulation. Timeframe: 20 visits.  Patient will improve LEFS score by at least 9 points to indicate a true change in improved function for ADL's and restoring PLF. Timeframe: 20 visits.    Plan: Continue to work on knee extension mobility.       Charges: MT x 1, Therex x 1, 1 TA   Total  Timed Treatment: 43 min    Total Treatment Time: 43 min

## 2024-09-13 NOTE — PROGRESS NOTES
Date of Service: 09/16/2024  Dx: DOI/S: 7/31/24           DOS: 7/31/24  Insurance: MEDICARE PART A&B  Insurance Limits: N/A  Visit #: 8  Authorized # of Visits: 8 recommended   POC/Auth Expiration: N/A  Date of Last PN: 8/16/24 (Visit #1)  Authorizing Physician/Provider: Nabil Sandoval MD visit: N/A  Fall Risk: Standard         Precautions: None            Subjective: Patient reports even after modifications to manual last visit he was sore and had to ice after       Pain/Symptom Presentation:  Resting pain pre-tx: 2-3/10  Pain at worst: 4/10    Objective:   9/12 L knee extension: -16 pre treatment, -10 post treatment, -5 with OP           L knee flexion: 105 pre-treatment, 116 post-treatment       HEP: Access Code: [email or web address]  URL: [email or web address]  Date: 09/12/2024  Prepared by: Maricruz Mejia    Exercises  - Long Sitting Quad Set with Towel Roll Under Heel  - 1 x daily - 7 x weekly - 20 reps - 5\" hold  - Standing Quad Set  - 1 x daily - 7 x weekly - 20 reps - 5\" hold  - Heel Raises with Counter Support  - 1 x daily - 7 x weekly - 20 reps  - Heel Toe Raises with Counter Support  - 1 x daily - 7 x weekly - 20 reps  - Mini Squat with Counter Support  - 1 x daily - 7 x weekly - 2 sets - 10 reps  - Gastroc Stretch with Foot at Wall  - 1 x daily - 7 x weekly - 3 sets - 30\" hold  - Seated Hamstring Stretch  - 1 x daily - 7 x weekly - 3 sets - 30\" hold  - Seated Passive Knee Extension with Weight  - 1 x daily - 7 x weekly - 3-5 min  hold    Treatment:    Therapeutic Exercise: x 25 min   Prone Lying (all during manual)  Added Prone Quad Contract/Relax for Knee Ext x 8  Added Prone Quad Contract/Relax for Knee Flex x 5  Quad set: x 20 x 5\" - one towel behind heel  Heel slides, 20 x 5\"   Seated HS stretch, 3 x 20\" (added hip IR/ER)   Upright Bike 5' L3 Height     Manual Therapy: x 20 min  Prone STM/IASTM: Posterior (L) Leg  Cupping: Lateral Lower Leg (1 cup)     Assessment: Patient continues to  demonstrate some limitations in knee extension, modified approaches of manual this day due to complaints of excessive soreness/pain following last several visits. Continued manual of posterior leg but in prone position this day with contract/relax to quads for both knee extension and knee flexion. Patient was able to get full forward and back revolutions on seat height of 3 this day by end of session, would benefit from continued efforts similar in prone and with contract/relax. Plan to add calf mobilization with ball next visit.     Goals:   Short-Term Goals:  Patient will improve knee extension AROM to 0deg to facilitate TKE at heel strike for normalized gait pattern. Timeframe: 6-8 visits.   Patient will improve knee flexion AROM to 120deg to allow for reciprocal stair navigation pattern for community integration. Timeframe: 6-8 visits.  The patient will improve quad strength to facilitate walking with a SP cane for daily ambulation. Timeframe: 8 visits.  The patient will improve LE strength and endurance to facilitate standing for extended periods of time for 2 hours daily for household and community ambulation,. Timeframe: 8 visits.  Patient will improve knee strength and stability to facilitate discontinued use of AD for daily ambulation. Timeframe: 12 visits.   Long-Term Goals:  The patient will improve LE strength and endurance to facilitate walking distances of 2 mile(s) daily for household and community ambulation. Timeframe: 16 visits.  Patient will improve lower motor control to perform double-leg squat with symmetrical loading, without asymmetries, and with proper posterior chain loading to facilitate squatting and lifting ADL's. Timeframe: 16 visits.  Patient will improve LE mobility, strength, and single-leg stabilization to meet strength requirements for reciprocal stair navigation pattern with no asymmetries for ascending and descending 5 flights of stairs daily for household and community  ambulation. Timeframe: 20 visits.  Patient will improve LEFS score by at least 9 points to indicate a true change in improved function for ADL's and restoring PLF. Timeframe: 20 visits.    Plan: Continue to work on knee extension mobility.       Charges: MT x 1, Therex x 2   Total Timed Treatment: 43 min    Total Treatment Time: 43 min

## 2024-09-16 ENCOUNTER — OFFICE VISIT (OUTPATIENT)
Dept: PHYSICAL THERAPY | Age: 74
End: 2024-09-16
Attending: ORTHOPAEDIC SURGERY
Payer: MEDICARE

## 2024-09-16 PROCEDURE — 97110 THERAPEUTIC EXERCISES: CPT

## 2024-09-16 PROCEDURE — 97140 MANUAL THERAPY 1/> REGIONS: CPT

## 2024-09-17 NOTE — PROGRESS NOTES
PHYSICAL THERAPY RE-EVALUATION:      Date of Service: 9/18/2024  Dx: DOI/S: 7/31/24           DOS: 7/31/24  Insurance: MEDICARE PART A&B  Insurance Limits: N/A  Visit #: 9  Authorized # of Visits: 8 recommended   POC/Auth Expiration: N/A  Date of Last PN: 8/16/24 (Visit #1)  Authorizing Physician/Provider: Nabil Sandoval MD visit: N/A  Fall Risk: Standard         Precautions: None            Subjective: The patient arrives today after his MD appt. He reports that his doctor is pleased with his progress but reminded him that the improvements will be slow.   The patient reports that he feels 60-70% recovered. \"I can walk around the house without stopping. I still feel some impediment in the scar tissue.\" He reports some remaining stiffness if he sits too long, maximum of a 30 minutes.     Pain/Symptom Presentation:  Resting pain pre-tx: 2-3/10  Pain at worst: 4/10    Objective:      Activity Measures:  Sleeping: No Activity Limitation: Patient is able to sleep up to 8 hours a night.   Standing/Walking After Prolonged Sitting: Mild Activity Limitation: Patient's stiffness subsides within 10 steps.   Car Transfers: Mild Activity Limitation: Patient is with some stiffness getting in and out of the car.   Walking: Mild Activity Limitation: Patient is able to walk up to 30-45 minutes before disruption due to knee pain.  Ascending Stairs: Mild Activity Limitation: Patient navigates up the stairs with step-over gait pattern, mild deviations, no cane.   Descending Stairs: Moderate Activity Limitation: Patient navigates down the stairs with step-to gait pattern, with cane. Patient reports that he would prefer to navigate down the stairs with step-to gait pattern for safety.     Inspection/Observation: The patient is with remaining swelling throughout his left knee.      ROM:          Knee Motion PROM AROM    Right Left Right Left   Knee Extension N/A N/A 0 -7   Knee Flexion 116 N/A 110 130   *indicates activity was associated  with pain     Strength/MMT:        Knee Motion Strength    Right Left   Knee Extension 5/5 3+/5   Knee Flexion 5/5 4/5   *indicates activity was associated with pain          Hip Motion Strength    Right Left   Hip Flexion 5/5 4/5   Hip Extension 4/5 4-/5   Hip ABD 4/5 4-/5   *indicates activity was associated with pain      HEP:   Access Code: [email or web address]  URL: [email or web address]  Date: 09/12/2024  Prepared by: Maricruz Mejia    Exercises  - Long Sitting Quad Set with Towel Roll Under Heel  - 1 x daily - 7 x weekly - 20 reps - 5\" hold  - Standing Quad Set  - 1 x daily - 7 x weekly - 20 reps - 5\" hold  - Heel Raises with Counter Support  - 1 x daily - 7 x weekly - 20 reps  - Heel Toe Raises with Counter Support  - 1 x daily - 7 x weekly - 20 reps  - Mini Squat with Counter Support  - 1 x daily - 7 x weekly - 2 sets - 10 reps  - Gastroc Stretch with Foot at Wall  - 1 x daily - 7 x weekly - 3 sets - 30\" hold  - Seated Hamstring Stretch  - 1 x daily - 7 x weekly - 3 sets - 30\" hold  - Seated Passive Knee Extension with Weight  - 1 x daily - 7 x weekly - 3-5 min  hold    Treatment:    Therapeutic Exercise: x 25min   PROM - knee flexion : 10 (L)   PROM - knee extension with overpressure at knee: x 10 (L)  PROM - knee extension with overpressure at knee and heel lift: x 10 (L)  Prone quad/contract with knee flexion: 3 x 3 (L)   Quad set: 1 x 20 x 5\" - one towel behind heel  SLR: 1 x 10 (L)   Heel slides: 1 x 10 x 5\"   LAQ: x 20 (L)   Standing lunging calf stretch: x 30\" (L)     Manual Therapy: x 20min  Soft tissue mobilization (supine): (L) quad, ITB/VL   Soft tissue mobilization (prone): proximal calf, distal hamstrings, posterior knee   Sup/inf patellar joint mobs: Grade 3, 4 x 10\" (L)   Medial patellar glides: Grade 3, 4 x 10\" (L)     Assessment: Duran is a 73 year old male that presents to physical therapy evaluation s/p (L) TKR 7/31/24 by Dr. Ferrer. The patient has been seen for 9 sessions in  physical therapy for treatment including manual therapy for improved knee mobility and ROM as well as exercises for progression in knee mobility, knee and hip strength, and overall functional strength. The patient demonstrates improved knee ROM measurements compared to initial evaluation, but is still lacking normalized knee mobility. The patient is decreasing the need for an AD and is with improved tolerance to prolonged standing and walking. He still reports knee stiffness after sitting. 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 The patient would benefit from continued physical therapy for further progression in knee mobility and strength and stability for improved tolerance to ADL's and return to PLF.     Goals:   Short-Term Goals:  Patient will improve knee flexion AROM to 120deg to allow for reciprocal stair navigation pattern for community integration. Timeframe: 12-14 visits. (IN PROGRESS 9/18/24. Updated goal timeframe.)   The patient will improve quad strength to facilitate walking with a SP cane for daily ambulation. Timeframe: 8 visits. (MET 9/18/24)  The patient will improve LE strength and endurance to facilitate standing for extended periods of time for 2 hours daily for household and community ambulation,. Timeframe: 8 visits. (MET 9/18/24)  Patient will improve knee strength and stability to facilitate discontinued use of AD for daily ambulation. Timeframe: 12 visits. (IN PROGRESS 9/18/24)  Long-Term Goals:  Patient will improve knee extension AROM to 0deg to facilitate TKE at heel strike for normalized gait pattern. Timeframe: 16 visits. (IN PROGRESS 9/18/24. Updated goal timeframe.)   The patient will improve LE strength and endurance to facilitate walking distances of 2 mile(s) daily for household and community ambulation. Timeframe: 16 visits. (IN PROGRESS 9/18/24)  Patient will improve lower motor control to perform double-leg squat  with symmetrical loading, without asymmetries, and with proper posterior chain loading to facilitate squatting and lifting ADL's. Timeframe: 16 visits. (IN PROGRESS 9/18/24)  Patient will improve LE mobility, strength, and single-leg stabilization to meet strength requirements for reciprocal stair navigation pattern with no asymmetries for ascending and descending 5 flights of stairs daily for household and community ambulation. Timeframe: 20 visits. (IN PROGRESS 9/18/24)  Patient will improve LEFS score by at least 9 points to indicate a true change in improved function for ADL's and restoring PLF. Timeframe: 20 visits. (IN PROGRESS 9/18/24)    Plan: Patient will be seen for 2x/week for an additional 4 weeks, for a total of another 8 visits, over a 90 day period. We will re-evaluate the patient at that time in order to determine functional progress, evaluate short-term goal completion, and establish an updated plan of care. Possible treatment interventions will/may include: Therapeutic Activities, Therapeutic Exercise, Neuromuscular Re-education, Manual Therapy, Home Exercise Program Instruction, Patient Education, Self-Care/Home Management, Gait Training, and Modalities as needed.       Charges: MT x 1, Therex x 2   Total Timed Treatment: 40min    Total Treatment Time: 40min

## 2024-09-18 ENCOUNTER — OFFICE VISIT (OUTPATIENT)
Dept: PHYSICAL THERAPY | Age: 74
End: 2024-09-18
Attending: ORTHOPAEDIC SURGERY
Payer: MEDICARE

## 2024-09-18 PROCEDURE — 97140 MANUAL THERAPY 1/> REGIONS: CPT

## 2024-09-18 PROCEDURE — 97110 THERAPEUTIC EXERCISES: CPT

## 2024-09-25 ENCOUNTER — OFFICE VISIT (OUTPATIENT)
Dept: PHYSICAL THERAPY | Age: 74
End: 2024-09-25
Attending: ORTHOPAEDIC SURGERY
Payer: MEDICARE

## 2024-09-25 PROCEDURE — 97110 THERAPEUTIC EXERCISES: CPT

## 2024-09-25 PROCEDURE — 97140 MANUAL THERAPY 1/> REGIONS: CPT

## 2024-09-25 NOTE — PROGRESS NOTES
Date of Service: 9/25/2024  Dx: DOI/S: 7/31/24           DOS: 7/31/24  Insurance: MEDICARE PART A&B  Insurance Limits: N/A  Visit #: 10  Authorized # of Visits: 16 recommended   POC/Auth Expiration: N/A  Date of Last PN: 9/18/24 (Visit #9)  Authorizing Physician/Provider: Nabil Sandoval MD visit: N/A  Fall Risk: Standard         Precautions: None            Subjective: The patient reports that he is having issues with sitting for any more than 10-15 minutes. He reports that this is worse than it has been in the past. He reports that he is with some issues driving the car as well. He reports that when he gets out of the car, he has to stand for a minute to stretch it out. He was able to ride the ride-on  thought, but it has more room to stretch out his leg, which he can't do in the car.     He is reporting some pain in the back of the leg and into his back. He still needs a cane when he gets up in the middle of the night.     Pain/Symptom Presentation:  Resting pain pre-tx: 2-3/10  Pain at worst: 4/10    Objective:      ROM:          Knee Motion PROM AROM    Right Left Right Left   Knee Extension N/A N/A 0 -7   Knee Flexion 111 N/A 110 130   *indicates activity was associated with pain     HEP:   Access Code: [email or web address]  URL: [email or web address]  Date: 09/12/2024  Prepared by: Maricruz Mejia    Exercises  - Long Sitting Quad Set with Towel Roll Under Heel  - 1 x daily - 7 x weekly - 20 reps - 5\" hold  - Standing Quad Set  - 1 x daily - 7 x weekly - 20 reps - 5\" hold  - Heel Raises with Counter Support  - 1 x daily - 7 x weekly - 20 reps  - Heel Toe Raises with Counter Support  - 1 x daily - 7 x weekly - 20 reps  - Mini Squat with Counter Support  - 1 x daily - 7 x weekly - 2 sets - 10 reps  - Gastroc Stretch with Foot at Wall  - 1 x daily - 7 x weekly - 3 sets - 30\" hold  - Seated Hamstring Stretch  - 1 x daily - 7 x weekly - 3 sets - 30\" hold  - Seated Passive Knee Extension with Weight   - 1 x daily - 7 x weekly - 3-5 min  hold    Treatment:    Therapeutic Exercise: x 20min   PROM - knee flexion : x 10 (L)   PROM - knee extension with overpressure at knee: x 8 (L)  PROM - knee extension with overpressure at knee and heel lift: x 8 (L)  Quad set: 1 x 20 x 5\" - towel roll under the heel   SLR: 1 x 10 (L)   Heel slides: 1 x 10 x 5\"   LAQ: x 20 (L)   Squat: 1 x 15    Manual Therapy: x 20min  Soft tissue mobilization (supine): (L) quad, ITB/VL   Soft tissue mobilization (prone): proximal calf, distal hamstrings, posterior knee   Sup/inf patellar joint mobs: Grade 3, 4 x 10\" (L)   Medial patellar glides: Grade 3, 4 x 10\" (L)     Assessment: Duran reports some increased soreness in his hips and low back which he was educated may be due to compensatory movement patterns. He is also with complaints of stiffness after prolonged sitting which he was educated is an anticipated complaint after TKE surgery. The patient was with increased pain after the stretching performed last session. We continued to work on knee mobility this date, but tried to ensure more comfort for the patient. He was able to reach 111deg knee flexion PROM this date and we continue to work on knee extension mobility. The patient would benefit from continued PT for further progression in knee extension mobility for normalized gait pattern.     Goals:   Short-Term Goals:  Patient will improve knee flexion AROM to 120deg to allow for reciprocal stair navigation pattern for community integration. Timeframe: 12-14 visits. (IN PROGRESS 9/18/24. Updated goal timeframe.)   The patient will improve quad strength to facilitate walking with a SP cane for daily ambulation. Timeframe: 8 visits. (MET 9/18/24)  The patient will improve LE strength and endurance to facilitate standing for extended periods of time for 2 hours daily for household and community ambulation,. Timeframe: 8 visits. (MET 9/18/24)  Patient will improve knee strength and stability  to facilitate discontinued use of AD for daily ambulation. Timeframe: 12 visits. (IN PROGRESS 9/18/24)  Long-Term Goals:  Patient will improve knee extension AROM to 0deg to facilitate TKE at heel strike for normalized gait pattern. Timeframe: 16 visits. (IN PROGRESS 9/18/24. Updated goal timeframe.)   The patient will improve LE strength and endurance to facilitate walking distances of 2 mile(s) daily for household and community ambulation. Timeframe: 16 visits. (IN PROGRESS 9/18/24)  Patient will improve lower motor control to perform double-leg squat with symmetrical loading, without asymmetries, and with proper posterior chain loading to facilitate squatting and lifting ADL's. Timeframe: 16 visits. (IN PROGRESS 9/18/24)  Patient will improve LE mobility, strength, and single-leg stabilization to meet strength requirements for reciprocal stair navigation pattern with no asymmetries for ascending and descending 5 flights of stairs daily for household and community ambulation. Timeframe: 20 visits. (IN PROGRESS 9/18/24)  Patient will improve LEFS score by at least 9 points to indicate a true change in improved function for ADL's and restoring PLF. Timeframe: 20 visits. (IN PROGRESS 9/18/24)    Plan: Continue to work on knee extension mobility for normalized gait pattern.       Charges: MT x 1, Therex x 2   Total Timed Treatment: 40min    Total Treatment Time: 40min

## 2024-09-30 NOTE — PROGRESS NOTES
Date of Service: 10/01/2024  Dx: DOI/S: 7/31/24           DOS: 7/31/24  Insurance: MEDICARE PART A&B  Insurance Limits: N/A  Visit #: 11  Authorized # of Visits: 16 recommended   POC/Auth Expiration: N/A  Date of Last PN: 9/18/24 (Visit #9)  Authorizing Physician/Provider: Nabil Sandoval MD visit: N/A  Fall Risk: Standard         Precautions: None            Subjective: Patient reports sitting tolerance is still limited regarding symptoms that begin in hips, but reports he has been able to not only continue with the riding mower, he was also able to use the push mower last week for up to an hour.     Pain/Symptom Presentation:  Resting pain pre-tx: 2-3/10  Pain at worst: 4/10    Objective:      ROM:          Knee Motion PROM AROM    Right Left Right Left   Knee Extension N/A N/A 0 -7   Knee Flexion 111 N/A 110 130   *indicates activity was associated with pain     HEP:   Access Code: [email or web address]  URL: [email or web address]  Date: 09/12/2024  Prepared by: Maricruz Mejia    Exercises  - Long Sitting Quad Set with Towel Roll Under Heel  - 1 x daily - 7 x weekly - 20 reps - 5\" hold  - Standing Quad Set  - 1 x daily - 7 x weekly - 20 reps - 5\" hold  - Heel Raises with Counter Support  - 1 x daily - 7 x weekly - 20 reps  - Heel Toe Raises with Counter Support  - 1 x daily - 7 x weekly - 20 reps  - Mini Squat with Counter Support  - 1 x daily - 7 x weekly - 2 sets - 10 reps  - Gastroc Stretch with Foot at Wall  - 1 x daily - 7 x weekly - 3 sets - 30\" hold  - Seated Hamstring Stretch  - 1 x daily - 7 x weekly - 3 sets - 30\" hold  - Seated Passive Knee Extension with Weight  - 1 x daily - 7 x weekly - 3-5 min  hold    Treatment:    Therapeutic Exercise: x 22 min   Upright Bike 5'  PROM - knee flexion : x 10 (L)   PROM - knee extension with overpressure at knee: x 8 (L)  Contract/Relax Quads for Knee flexion, extension x 5 each (Prone)  PROM - knee extension with overpressure at knee and heel lift: x 8  (L)  Quad set: 1 x 20 x 5\" - towel roll under the heel   SLR with QS: 1 x 10 (L)   Added Staggered Bridges x 10 5\" H   Heel slides: 1 x 20 x 5\"     Manual Therapy: x 23 min  Soft tissue mobilization (supine): (L) quad, ITB/VL   Soft tissue mobilization (prone): proximal calf, distal hamstrings, posterior knee   Cupping: Hamstrings     Assessment: Patient demonstrated some tension return in posterior chain, noting limited TKE. Addressed this with aggressive PROM, contract/relax both for knee flexion and extension in prone, and cupping to trigger points of hamstrings. Patient was able to progress to AROM knee flexion 110, and PROM 120 degrees by end of tx this day. Patient would benefit from continued efforts to restore knee extension for normalized gait and reduce compensations that effect LBP and hips..     Goals:   Short-Term Goals:  Patient will improve knee flexion AROM to 120deg to allow for reciprocal stair navigation pattern for community integration. Timeframe: 12-14 visits. (IN PROGRESS 9/18/24. Updated goal timeframe.)   The patient will improve quad strength to facilitate walking with a SP cane for daily ambulation. Timeframe: 8 visits. (MET 9/18/24)  The patient will improve LE strength and endurance to facilitate standing for extended periods of time for 2 hours daily for household and community ambulation,. Timeframe: 8 visits. (MET 9/18/24)  Patient will improve knee strength and stability to facilitate discontinued use of AD for daily ambulation. Timeframe: 12 visits. (IN PROGRESS 9/18/24)  Long-Term Goals:  Patient will improve knee extension AROM to 0deg to facilitate TKE at heel strike for normalized gait pattern. Timeframe: 16 visits. (IN PROGRESS 9/18/24. Updated goal timeframe.)   The patient will improve LE strength and endurance to facilitate walking distances of 2 mile(s) daily for household and community ambulation. Timeframe: 16 visits. (IN PROGRESS 9/18/24)  Patient will improve lower  motor control to perform double-leg squat with symmetrical loading, without asymmetries, and with proper posterior chain loading to facilitate squatting and lifting ADL's. Timeframe: 16 visits. (IN PROGRESS 9/18/24)  Patient will improve LE mobility, strength, and single-leg stabilization to meet strength requirements for reciprocal stair navigation pattern with no asymmetries for ascending and descending 5 flights of stairs daily for household and community ambulation. Timeframe: 20 visits. (IN PROGRESS 9/18/24)  Patient will improve LEFS score by at least 9 points to indicate a true change in improved function for ADL's and restoring PLF. Timeframe: 20 visits. (IN PROGRESS 9/18/24)    Plan: Continue to work on knee extension mobility for normalized gait pattern.       Charges: MT x 2, Therex x 1   Total Timed Treatment: 45 min    Total Treatment Time: 45 min

## 2024-10-01 ENCOUNTER — OFFICE VISIT (OUTPATIENT)
Dept: PHYSICAL THERAPY | Age: 74
End: 2024-10-01
Attending: ORTHOPAEDIC SURGERY
Payer: MEDICARE

## 2024-10-01 PROCEDURE — 97140 MANUAL THERAPY 1/> REGIONS: CPT

## 2024-10-01 PROCEDURE — 97110 THERAPEUTIC EXERCISES: CPT

## 2024-10-02 NOTE — PROGRESS NOTES
Date of Service: 10/03/2024  Dx: DOI/S: 7/31/24           DOS: 7/31/24  Insurance: MEDICARE PART A&B  Insurance Limits: N/A  Visit #: 12  Authorized # of Visits: 16 recommended   POC/Auth Expiration: N/A  Date of Last PN: 9/18/24 (Visit #9)  Authorizing Physician/Provider: Nabil Sandoval MD visit: N/A  Fall Risk: Standard         Precautions: None            Subjective: Patient reports he was very sore after last visit and still today, but a bit better. Patient reports standing up from sitting position has been less troublesome this week and his back isn't painful when doing bridges like it was before. Patient reports the back pain in general is improving as his knee gets straighter.    Pain/Symptom Presentation:  Resting pain pre-tx: 2-3/10  Pain at worst: 4/10    Objective:      ROM:          Knee Motion PROM AROM    Right Left Right Left   Knee Extension N/A N/A 0 -7   Knee Flexion 111 N/A 110 130   *indicates activity was associated with pain     HEP:   Access Code: [email or web address]  URL: [email or web address]  Date: 09/12/2024  Prepared by: Maricruz Mejia    Exercises  - Long Sitting Quad Set with Towel Roll Under Heel  - 1 x daily - 7 x weekly - 20 reps - 5\" hold  - Standing Quad Set  - 1 x daily - 7 x weekly - 20 reps - 5\" hold  - Heel Raises with Counter Support  - 1 x daily - 7 x weekly - 20 reps  - Heel Toe Raises with Counter Support  - 1 x daily - 7 x weekly - 20 reps  - Mini Squat with Counter Support  - 1 x daily - 7 x weekly - 2 sets - 10 reps  - Gastroc Stretch with Foot at Wall  - 1 x daily - 7 x weekly - 3 sets - 30\" hold  - Seated Hamstring Stretch  - 1 x daily - 7 x weekly - 3 sets - 30\" hold  - Seated Passive Knee Extension with Weight  - 1 x daily - 7 x weekly - 3-5 min  hold    Treatment:    Therapeutic Exercise: x 23 min     HS Mob w Ball x 20 (L)  Quad set: 1 x 20 x 5\" - towel roll under the heel   SLR with QS: 1 x 10 (L)   PROM - knee extension with overpressure at knee: x 8  (L)  PROM - knee extension with overpressure at knee and heel lift: x 8 (L)  PROM - knee flexion : x 10 (L)   Staggered Bridges 2 x 10 5\" H     Neuro: 5 min  Tandem Stance 2 x 30\" ea R/L  SLS 15\" ea R/L     Manual Therapy: x 15 min  MFR: Posterior leg (L)  IASTM: Lower Leg, HS  Cupping: HS     Assessment: Patient continues to benefit from increased focus on tissue mobility of hamstrings and lower leg as patient is still limited to AROM knee extension to -6 degrees. Patient reporting overall improved tolerance to activities that were previously more difficult such as mowing the lawn, is able to stand up from sitting position delonte easily, and has less back pain in general activity as posterior leg mobility has improved. Patient would benefit from continued care to restore normal ROM and ensure better pain management as aches/pains are still present at times.    Goals:   Short-Term Goals:  Patient will improve knee flexion AROM to 120deg to allow for reciprocal stair navigation pattern for community integration. Timeframe: 12-14 visits. (IN PROGRESS 9/18/24. Updated goal timeframe.)   The patient will improve quad strength to facilitate walking with a SP cane for daily ambulation. Timeframe: 8 visits. (MET 9/18/24)  The patient will improve LE strength and endurance to facilitate standing for extended periods of time for 2 hours daily for household and community ambulation,. Timeframe: 8 visits. (MET 9/18/24)  Patient will improve knee strength and stability to facilitate discontinued use of AD for daily ambulation. Timeframe: 12 visits. (IN PROGRESS 9/18/24)  Long-Term Goals:  Patient will improve knee extension AROM to 0deg to facilitate TKE at heel strike for normalized gait pattern. Timeframe: 16 visits. (IN PROGRESS 9/18/24. Updated goal timeframe.)   The patient will improve LE strength and endurance to facilitate walking distances of 2 mile(s) daily for household and community ambulation. Timeframe: 16 visits.  (IN PROGRESS 9/18/24)  Patient will improve lower motor control to perform double-leg squat with symmetrical loading, without asymmetries, and with proper posterior chain loading to facilitate squatting and lifting ADL's. Timeframe: 16 visits. (IN PROGRESS 9/18/24)  Patient will improve LE mobility, strength, and single-leg stabilization to meet strength requirements for reciprocal stair navigation pattern with no asymmetries for ascending and descending 5 flights of stairs daily for household and community ambulation. Timeframe: 20 visits. (IN PROGRESS 9/18/24)  Patient will improve LEFS score by at least 9 points to indicate a true change in improved function for ADL's and restoring PLF. Timeframe: 20 visits. (IN PROGRESS 9/18/24)    Plan: Continue to work on knee extension mobility for normalized gait pattern.       Charges: MT x 1, Therex x 2   Total Timed Treatment: 43 min    Total Treatment Time: 43 min

## 2024-10-03 ENCOUNTER — OFFICE VISIT (OUTPATIENT)
Dept: PHYSICAL THERAPY | Age: 74
End: 2024-10-03
Attending: ORTHOPAEDIC SURGERY
Payer: MEDICARE

## 2024-10-03 PROCEDURE — 97110 THERAPEUTIC EXERCISES: CPT

## 2024-10-03 PROCEDURE — 97140 MANUAL THERAPY 1/> REGIONS: CPT

## 2024-10-04 ENCOUNTER — APPOINTMENT (OUTPATIENT)
Dept: PHYSICAL THERAPY | Age: 74
End: 2024-10-04
Attending: ORTHOPAEDIC SURGERY
Payer: MEDICARE

## 2024-10-09 ENCOUNTER — OFFICE VISIT (OUTPATIENT)
Dept: PHYSICAL THERAPY | Age: 74
End: 2024-10-09
Attending: ORTHOPAEDIC SURGERY
Payer: MEDICARE

## 2024-10-09 PROCEDURE — 97530 THERAPEUTIC ACTIVITIES: CPT

## 2024-10-09 PROCEDURE — 97110 THERAPEUTIC EXERCISES: CPT

## 2024-10-09 PROCEDURE — 97140 MANUAL THERAPY 1/> REGIONS: CPT

## 2024-10-09 NOTE — PROGRESS NOTES
Date of Service: 10/09/2024  Dx: DOI/S: 7/31/24           DOS: 7/31/24  Insurance: MEDICARE PART A&B  Insurance Limits: N/A  Visit #: 13  Authorized # of Visits: 16 recommended   POC/Auth Expiration: N/A  Date of Last PN: 9/18/24 (Visit #9)  Authorizing Physician/Provider: Nabil Sandoval MD visit: N/A  Fall Risk: Standard         Precautions: None            Subjective: Patient reports the more activity he does outside of PT he notes discomfort in his back and generally \"all over.\" Patient reports symptoms in his knee aside from restricted knee flexion and extension is mostly pressure, but not pain.     Pain/Symptom Presentation:  Resting pain pre-tx: 2-3/10  Pain at worst: 4/10    Objective:      ROM:          Knee Motion PROM AROM    Right Left Right Left   Knee Extension N/A N/A 0 -7   Knee Flexion 111 N/A 110 130   *indicates activity was associated with pain     HEP:   Access Code: [email or web address]  URL: [email or web address]  Date: 09/12/2024  Prepared by: Maricruz Mejia    Exercises  - Long Sitting Quad Set with Towel Roll Under Heel  - 1 x daily - 7 x weekly - 20 reps - 5\" hold  - Standing Quad Set  - 1 x daily - 7 x weekly - 20 reps - 5\" hold  - Heel Raises with Counter Support  - 1 x daily - 7 x weekly - 20 reps  - Heel Toe Raises with Counter Support  - 1 x daily - 7 x weekly - 20 reps  - Mini Squat with Counter Support  - 1 x daily - 7 x weekly - 2 sets - 10 reps  - Gastroc Stretch with Foot at Wall  - 1 x daily - 7 x weekly - 3 sets - 30\" hold  - Seated Hamstring Stretch  - 1 x daily - 7 x weekly - 3 sets - 30\" hold  - Seated Passive Knee Extension with Weight  - 1 x daily - 7 x weekly - 3-5 min  hold    Treatment:    Ther Act: x 20 min  Added Sidestepping RTB x 2 Laps   Added Resisted Forward/Retro Stepping RTBx 2 Laps   STS x 10   STS (L) Bias x 10    Therapeutic Exercise: x 10 min   Standing DLHR x 20   Standing DLTR x 20   PROM - knee extension with overpressure at knee: x 8 (L)  PROM -  knee extension with overpressure at knee and heel lift: x 8 (L)    NP  HS Mob w Ball x 20 (L)  Quad set: 1 x 20 x 5\" - towel roll under the heel   SLR with QS: 1 x 10 (L)   PROM - knee flexion : x 10 (L)   Staggered Bridges 2 x 10 5\" H     Neuro: 5 min  Tandem Stance 2 x 30\" ea R/L  SLS 30\" ea R/L     Manual Therapy: x 15 min  MFR: Posterior leg (L)  IASTM: Lower Leg, HS  Cupping: HS     Assessment: Patient care this day focused on progressing functional strength, patient still presenting with knee extension limitations that was addressed with emphasizing TKE with forward/retro resisted walks, standing heel and toe raises, and manual intervention. Patient would benefit from updated HEP next visit including standing heel/toe raises, bias (L) STS, hamstring stretch and self mobilization with ball, SLS, and QS with heel prop. Plan to continue functional strengthening and mobility of left knee progressions.     Goals:   Short-Term Goals:  Patient will improve knee flexion AROM to 120deg to allow for reciprocal stair navigation pattern for community integration. Timeframe: 12-14 visits. (IN PROGRESS 9/18/24. Updated goal timeframe.)   The patient will improve quad strength to facilitate walking with a SP cane for daily ambulation. Timeframe: 8 visits. (MET 9/18/24)  The patient will improve LE strength and endurance to facilitate standing for extended periods of time for 2 hours daily for household and community ambulation,. Timeframe: 8 visits. (MET 9/18/24)  Patient will improve knee strength and stability to facilitate discontinued use of AD for daily ambulation. Timeframe: 12 visits. (IN PROGRESS 9/18/24)  Long-Term Goals:  Patient will improve knee extension AROM to 0deg to facilitate TKE at heel strike for normalized gait pattern. Timeframe: 16 visits. (IN PROGRESS 9/18/24. Updated goal timeframe.)   The patient will improve LE strength and endurance to facilitate walking distances of 2 mile(s) daily for household  and community ambulation. Timeframe: 16 visits. (IN PROGRESS 9/18/24)  Patient will improve lower motor control to perform double-leg squat with symmetrical loading, without asymmetries, and with proper posterior chain loading to facilitate squatting and lifting ADL's. Timeframe: 16 visits. (IN PROGRESS 9/18/24)  Patient will improve LE mobility, strength, and single-leg stabilization to meet strength requirements for reciprocal stair navigation pattern with no asymmetries for ascending and descending 5 flights of stairs daily for household and community ambulation. Timeframe: 20 visits. (IN PROGRESS 9/18/24)  Patient will improve LEFS score by at least 9 points to indicate a true change in improved function for ADL's and restoring PLF. Timeframe: 20 visits. (IN PROGRESS 9/18/24)    Plan: Continue to work on knee extension mobility for normalized gait pattern.       Charges: MT x 1, Therex x 1, Ther Act x 1   Total Timed Treatment: 45 min    Total Treatment Time: 45 min

## 2024-10-15 NOTE — PROGRESS NOTES
Date of Service: 10/16/2024  Dx: DOI/S: 7/31/24           DOS: 7/31/24  Insurance: MEDICARE PART A&B  Insurance Limits: N/A  Visit #: 14  Authorized # of Visits: 16 recommended   POC/Auth Expiration: N/A  Date of Last PN: 9/18/24 (Visit #9)  Authorizing Physician/Provider: Nabil Sandoval MD visit: N/A  Fall Risk: Standard         Precautions: None            Subjective: The patient reports that he saw the doctor yesterday. He was just told to follow-up in a year. They just talked numbness for his mobility.     He reports that he went for a four-hour boat ride. He reports that the ride wasn't as bad as he thought, but getting off the boat was difficult due to the rocking of the boat. He reports that he was exhausted by the end of the day.      He reports remaining soreness in his quads. \"The knee don't hurt at all.\" He can now cross his left foot over his knee.     Pain/Symptom Presentation:  Resting pain pre-tx: 2-3/10  Pain at worst: 4/10    Objective:      HEP:   Access Code: [email or web address]  URL: [email or web address]  Date: 09/12/2024  Prepared by: Maricruz Mejia    Exercises  - Long Sitting Quad Set with Towel Roll Under Heel  - 1 x daily - 7 x weekly - 20 reps - 5\" hold  - Standing Quad Set  - 1 x daily - 7 x weekly - 20 reps - 5\" hold  - Heel Raises with Counter Support  - 1 x daily - 7 x weekly - 20 reps  - Heel Toe Raises with Counter Support  - 1 x daily - 7 x weekly - 20 reps  - Mini Squat with Counter Support  - 1 x daily - 7 x weekly - 2 sets - 10 reps  - Gastroc Stretch with Foot at Wall  - 1 x daily - 7 x weekly - 3 sets - 30\" hold  - Seated Hamstring Stretch  - 1 x daily - 7 x weekly - 3 sets - 30\" hold  - Seated Passive Knee Extension with Weight  - 1 x daily - 7 x weekly - 3-5 min  hold    Treatment:    Therapeutic Exercise: x 17min   PROM - knee extension with overpressure x 10 (L)   PROM - knee extension with overpressure and heel lift x 10 (L)   PROM - knee flexion with  overpressure: x 8 (L)  Quad set: 1 x 20 x 5\" (L)   Standing DLHR x 20 - focusing on TKE  TKE with ball against wall: 2 x 10 x 3\" (L)   Step-up: 1 x 10 (L), 6\" step, focusing on TKE on lead leg    Manual Therapy: x 23min  Soft tissue mobilization (prone): (L) hamstrings, calf, posterior knee   Soft tissue mobilization (supine): (L) quads, adductors, ITB/VL  Inf patellar joint mobs: Grade 3, 4 x 10\" (L)     Assessment: We continue to work on end-range knee extension mobility with Duran. Functionally, he is doing well. He is able to use the ride-on mower, the push mower, and the skid steer and is progressing in strength and endurance. He still notes some remaining fatigue with prolonged activities. The patient would benefit from continued PT for further progression in knee mobility and additional strength and endurance training for full return to PLF.     Goals:   Short-Term Goals:  Patient will improve knee flexion AROM to 120deg to allow for reciprocal stair navigation pattern for community integration. Timeframe: 12-14 visits. (IN PROGRESS 9/18/24. Updated goal timeframe.)   The patient will improve quad strength to facilitate walking with a SP cane for daily ambulation. Timeframe: 8 visits. (MET 9/18/24)  The patient will improve LE strength and endurance to facilitate standing for extended periods of time for 2 hours daily for household and community ambulation,. Timeframe: 8 visits. (MET 9/18/24)  Patient will improve knee strength and stability to facilitate discontinued use of AD for daily ambulation. Timeframe: 12 visits. (IN PROGRESS 9/18/24)  Long-Term Goals:  Patient will improve knee extension AROM to 0deg to facilitate TKE at heel strike for normalized gait pattern. Timeframe: 16 visits. (IN PROGRESS 9/18/24. Updated goal timeframe.)   The patient will improve LE strength and endurance to facilitate walking distances of 2 mile(s) daily for household and community ambulation. Timeframe: 16 visits. (IN  PROGRESS 9/18/24)  Patient will improve lower motor control to perform double-leg squat with symmetrical loading, without asymmetries, and with proper posterior chain loading to facilitate squatting and lifting ADL's. Timeframe: 16 visits. (IN PROGRESS 9/18/24)  Patient will improve LE mobility, strength, and single-leg stabilization to meet strength requirements for reciprocal stair navigation pattern with no asymmetries for ascending and descending 5 flights of stairs daily for household and community ambulation. Timeframe: 20 visits. (IN PROGRESS 9/18/24)  Patient will improve LEFS score by at least 9 points to indicate a true change in improved function for ADL's and restoring PLF. Timeframe: 20 visits. (IN PROGRESS 9/18/24)    Plan: Progress summary in the next two sessions.       Charges: MT x 2, Therex x 1   Total Timed Treatment: 40min    Total Treatment Time: 40min

## 2024-10-16 ENCOUNTER — OFFICE VISIT (OUTPATIENT)
Dept: PHYSICAL THERAPY | Age: 74
End: 2024-10-16
Attending: ORTHOPAEDIC SURGERY
Payer: MEDICARE

## 2024-10-16 PROCEDURE — 97140 MANUAL THERAPY 1/> REGIONS: CPT

## 2024-10-16 PROCEDURE — 97110 THERAPEUTIC EXERCISES: CPT

## 2024-10-18 ENCOUNTER — OFFICE VISIT (OUTPATIENT)
Dept: PHYSICAL THERAPY | Age: 74
End: 2024-10-18
Attending: ORTHOPAEDIC SURGERY
Payer: MEDICARE

## 2024-10-18 PROCEDURE — 97110 THERAPEUTIC EXERCISES: CPT

## 2024-10-18 PROCEDURE — 97140 MANUAL THERAPY 1/> REGIONS: CPT

## 2024-10-18 NOTE — PROGRESS NOTES
Date of Service: 10/18/2024  Dx: DOI/S: 7/31/24           DOS: 7/31/24  Insurance: MEDICARE PART A&B  Insurance Limits: N/A  Visit #: 15  Authorized # of Visits: 16 recommended   POC/Auth Expiration: N/A  Date of Last PN: 9/18/24 (Visit #9)  Authorizing Physician/Provider: Nabil Sandoval MD visit: N/A  Fall Risk: Standard         Precautions: None            Subjective: The patient reports that he is with a bit more tenderness over the lateral and medial aspect of his knee after his last session. He reports that if he sits any longer than 2 minutes, he is with stiffness, but it resolves within a few steps.     Pain/Symptom Presentation:  Resting pain pre-tx: 2-3/10  Pain at worst: 4/10    Objective:      HEP:   Access Code: [email or web address]  URL: [email or web address]  Date: 09/12/2024  Prepared by: Maricruz Mejia    Exercises  - Long Sitting Quad Set with Towel Roll Under Heel  - 1 x daily - 7 x weekly - 20 reps - 5\" hold  - Standing Quad Set  - 1 x daily - 7 x weekly - 20 reps - 5\" hold  - Heel Raises with Counter Support  - 1 x daily - 7 x weekly - 20 reps  - Heel Toe Raises with Counter Support  - 1 x daily - 7 x weekly - 20 reps  - Mini Squat with Counter Support  - 1 x daily - 7 x weekly - 2 sets - 10 reps  - Gastroc Stretch with Foot at Wall  - 1 x daily - 7 x weekly - 3 sets - 30\" hold  - Seated Hamstring Stretch  - 1 x daily - 7 x weekly - 3 sets - 30\" hold  - Seated Passive Knee Extension with Weight  - 1 x daily - 7 x weekly - 3-5 min  hold    Treatment:    Therapeutic Activities: x 10min   Step-up: 1 x 10 (L), 6\" step, focusing on TKE on lead leg  Standing split squat: 1 x 10 (B) in parallel bars   Lateral lunges in parallel bars     Therapeutic Exercise: x 10min   PROM - knee extension with overpressure and heel lift x 10 (L)   Quad set: 1 x 20 x 5\" (L)   Standing DLHR x 20 - focusing on TKE  TKE with ball against wall: 2 x 10 x 3\" (L)     Manual Therapy: x 20min  Soft tissue mobilization  (prone): (L) hamstrings, calf, posterior knee   Soft tissue mobilization (supine): (L) quads, adductors, ITB/VL  Inf patellar joint mobs: Grade 3, 4 x 10\" (L)    Assessment: Duran reports some increased soreness after the extensive MT performed last session. We continued MT, but with less pressure and reduced time. We continue to progress LE strengthening exercise for continued improvements in function and household chores and home maintenance activities. The patient would benefit from continued PT for further progression in knee mobility and strength for full return to .    Goals:   Short-Term Goals:  Patient will improve knee flexion AROM to 120deg to allow for reciprocal stair navigation pattern for community integration. Timeframe: 12-14 visits. (IN PROGRESS 9/18/24. Updated goal timeframe.)   The patient will improve quad strength to facilitate walking with a SP cane for daily ambulation. Timeframe: 8 visits. (MET 9/18/24)  The patient will improve LE strength and endurance to facilitate standing for extended periods of time for 2 hours daily for household and community ambulation,. Timeframe: 8 visits. (MET 9/18/24)  Patient will improve knee strength and stability to facilitate discontinued use of AD for daily ambulation. Timeframe: 12 visits. (IN PROGRESS 9/18/24)  Long-Term Goals:  Patient will improve knee extension AROM to 0deg to facilitate TKE at heel strike for normalized gait pattern. Timeframe: 16 visits. (IN PROGRESS 9/18/24. Updated goal timeframe.)   The patient will improve LE strength and endurance to facilitate walking distances of 2 mile(s) daily for household and community ambulation. Timeframe: 16 visits. (IN PROGRESS 9/18/24)  Patient will improve lower motor control to perform double-leg squat with symmetrical loading, without asymmetries, and with proper posterior chain loading to facilitate squatting and lifting ADL's. Timeframe: 16 visits. (IN PROGRESS 9/18/24)  Patient will improve  LE mobility, strength, and single-leg stabilization to meet strength requirements for reciprocal stair navigation pattern with no asymmetries for ascending and descending 5 flights of stairs daily for household and community ambulation. Timeframe: 20 visits. (IN PROGRESS 9/18/24)  Patient will improve LEFS score by at least 9 points to indicate a true change in improved function for ADL's and restoring PLF. Timeframe: 20 visits. (IN PROGRESS 9/18/24)    Plan: Progress summary next session.       Charges: MT x 1, Therex x 2  Total Timed Treatment: 40min    Total Treatment Time: 40min

## 2024-10-22 ENCOUNTER — OFFICE VISIT (OUTPATIENT)
Dept: PHYSICAL THERAPY | Age: 74
End: 2024-10-22
Attending: ORTHOPAEDIC SURGERY
Payer: MEDICARE

## 2024-10-22 PROCEDURE — 97140 MANUAL THERAPY 1/> REGIONS: CPT

## 2024-10-22 PROCEDURE — 97110 THERAPEUTIC EXERCISES: CPT

## 2024-10-22 NOTE — PROGRESS NOTES
Date of Service: 10/22/2024  Dx: Status post total knee replacement, left (Z96.652), Primary osteoarthritis of left knee (M17.12)   DOS: 7/31/24  Insurance: MEDICARE PART A&B  Insurance Limits: N/A  Visit #: 16  Authorized # of Visits: 20 recommended   POC/Auth Expiration: N/A  Date of Last PN: 9/18/24 (Visit #9)  Authorizing Physician/Provider: Nabil Sandoval MD visit: N/A  Fall Risk: Standard         Precautions: None            Subjective: The patient reports that he feels his knee is still swollen. The patient inquires about criterion for progression to discharge.     Objective:       Pain/Symptom Presentation:  Resting pain pre-tx: 0/10  Pain at worst: 2-3/10    HEP:   Access Code: [email or web address]  URL: [email or web address]  Date: 09/12/2024  Prepared by: Maricruz Mejia    Exercises  - Long Sitting Quad Set with Towel Roll Under Heel  - 1 x daily - 7 x weekly - 20 reps - 5\" hold  - Standing Quad Set  - 1 x daily - 7 x weekly - 20 reps - 5\" hold  - Heel Raises with Counter Support  - 1 x daily - 7 x weekly - 20 reps  - Heel Toe Raises with Counter Support  - 1 x daily - 7 x weekly - 20 reps  - Mini Squat with Counter Support  - 1 x daily - 7 x weekly - 2 sets - 10 reps  - Gastroc Stretch with Foot at Wall  - 1 x daily - 7 x weekly - 3 sets - 30\" hold  - Seated Hamstring Stretch  - 1 x daily - 7 x weekly - 3 sets - 30\" hold  - Seated Passive Knee Extension with Weight  - 1 x daily - 7 x weekly - 3-5 min  hold    Treatment:    Therapeutic Activities: x 10min   Step-up: 1 x 10 (L), 6\" step, focusing on TKE on lead leg  Standing split squat: 1 x 10 (B) in parallel bars   Lateral lunges: 1 x 10 (B) in parallel bars     Therapeutic Exercise: x 10min   PROM - knee extension with overpressure and heel lift x 10 (L)   Quad set: 1 x 20 x 5\" (L)   Standing DLHR x 20 - focusing on TKE  TKE with ball against wall: 2 x 10 x 3\" (L)   LAQ: 2 x 10 (L) focusing on TKE     Manual Therapy: x 20min  Soft tissue  mobilization (prone): (L) hamstrings, calf, posterior knee   Soft tissue mobilization (supine): (L) quads, adductors, ITB/VL  Inf patellar joint mobs: Grade 3, 4 x 10\" (L)    Assessment: We continue to work on end-range knee extension mobility with Duran. 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 In discussion with the patient, we will assess for readiness for progression to discharge in the next couple of weeks per patient request.     Goals:   Short-Term Goals:  Patient will improve knee flexion AROM to 120deg to allow for reciprocal stair navigation pattern for community integration. Timeframe: 12-14 visits. (IN PROGRESS 9/18/24. Updated goal timeframe.)   The patient will improve quad strength to facilitate walking with a SP cane for daily ambulation. Timeframe: 8 visits. (MET 9/18/24)  The patient will improve LE strength and endurance to facilitate standing for extended periods of time for 2 hours daily for household and community ambulation,. Timeframe: 8 visits. (MET 9/18/24)  Patient will improve knee strength and stability to facilitate discontinued use of AD for daily ambulation. Timeframe: 12 visits. (IN PROGRESS 9/18/24)  Long-Term Goals:  Patient will improve knee extension AROM to 0deg to facilitate TKE at heel strike for normalized gait pattern. Timeframe: 16 visits. (IN PROGRESS 9/18/24. Updated goal timeframe.)   The patient will improve LE strength and endurance to facilitate walking distances of 2 mile(s) daily for household and community ambulation. Timeframe: 16 visits. (IN PROGRESS 9/18/24)  Patient will improve lower motor control to perform double-leg squat with symmetrical loading, without asymmetries, and with proper posterior chain loading to facilitate squatting and lifting ADL's. Timeframe: 16 visits. (IN PROGRESS 9/18/24)  Patient will improve LE mobility, strength, and single-leg stabilization to meet strength requirements for reciprocal stair  navigation pattern with no asymmetries for ascending and descending 5 flights of stairs daily for household and community ambulation. Timeframe: 20 visits. (IN PROGRESS 9/18/24)  Patient will improve LEFS score by at least 9 points to indicate a true change in improved function for ADL's and restoring PLF. Timeframe: 20 visits. (IN PROGRESS 9/18/24)    Plan:  Patient will be seen for 2x/week for an additional 2 weeks and will assess for readiness to progress for discharge.     Charges: MT x 1, Therex x 2  Total Timed Treatment: 40min    Total Treatment Time: 40min

## 2024-10-24 ENCOUNTER — OFFICE VISIT (OUTPATIENT)
Dept: PHYSICAL THERAPY | Age: 74
End: 2024-10-24
Attending: ORTHOPAEDIC SURGERY
Payer: MEDICARE

## 2024-10-24 PROCEDURE — 97140 MANUAL THERAPY 1/> REGIONS: CPT

## 2024-10-24 PROCEDURE — 97110 THERAPEUTIC EXERCISES: CPT

## 2024-10-29 ENCOUNTER — OFFICE VISIT (OUTPATIENT)
Dept: PHYSICAL THERAPY | Age: 74
End: 2024-10-29
Attending: ORTHOPAEDIC SURGERY
Payer: MEDICARE

## 2024-10-29 PROCEDURE — 97110 THERAPEUTIC EXERCISES: CPT

## 2024-10-29 PROCEDURE — 97140 MANUAL THERAPY 1/> REGIONS: CPT

## 2024-10-29 NOTE — PROGRESS NOTES
PHYSICAL THERAPY DISCHARGE:      Date of Service: 10/29/2024  Dx: Status post total knee replacement, left (Z96.652), Primary osteoarthritis of left knee (M17.12)   DOS: 7/31/24  Insurance: MEDICARE PART A&B  Insurance Limits: N/A  Visit #: 18  Authorized # of Visits: 20 recommended   POC/Auth Expiration: N/A  Date of Last PN: 9/18/24 (Visit #9)  Authorizing Physician/Provider: Nabil Sandoval MD visit: N/A  Fall Risk: Standard         Precautions: None            Subjective: The patient reports that he \"is doing a tremendous amount of walking during the day.\" He reports stiffness after sitting that resolves within 20 steps.    Objective:       Pain/Symptom Presentation:  Resting pain pre-tx: 1-2/10  Pain at worst: 4-5/10    Activity Measures:  Sleeping: No Activity Limitation: Patient is able to sleep up to 8 hours a night.   Standing/Walking After Prolonged Sitting: Mild Activity Limitation: Patient's stiffness subsides within 10 steps.   Car Transfers: Mild Activity Limitation: Patient is with some stiffness getting in and out of the car.   Walking: Mild Activity Limitation: Patient is able to walk up to 30-45 minutes before disruption due to knee pain.  Ascending Stairs: No Activity Limitation: Patient navigates up the stairs with step-over gait pattern, no AD.  Descending Stairs: Mild Activity Limitation: Patient navigates down the stairs with step-over gait pattern, mild deviations.     ROM:               Knee Motion PROM AROM    Right Left Right Left   Knee Extension N/A -2 0 -4   Knee Flexion 116 N/A 110 130   *indicates activity was associated with pain     Strength/MMT:           Knee Motion Strength    Right Left   Knee Extension 5/5 4-/5   Knee Flexion 5/5 4+/5   *indicates activity was associated with pain             Hip Motion Strength    Right Left   Hip Flexion 5/5 4+5   Hip Extension 4/5 4/5   Hip ABD 4/5 4/5   *indicates activity was associated with pain    HEP:   Access Code: [email or web address]  URL:  [email or web address]  Date: 09/12/2024  Prepared by: Maricruz Mejia    Exercises  - Long Sitting Quad Set with Towel Roll Under Heel  - 1 x daily - 7 x weekly - 20 reps - 5\" hold  - Standing Quad Set  - 1 x daily - 7 x weekly - 20 reps - 5\" hold  - Heel Raises with Counter Support  - 1 x daily - 7 x weekly - 20 reps  - Heel Toe Raises with Counter Support  - 1 x daily - 7 x weekly - 20 reps  - Mini Squat with Counter Support  - 1 x daily - 7 x weekly - 2 sets - 10 reps  - Gastroc Stretch with Foot at Wall  - 1 x daily - 7 x weekly - 3 sets - 30\" hold  - Seated Hamstring Stretch  - 1 x daily - 7 x weekly - 3 sets - 30\" hold  - Seated Passive Knee Extension with Weight  - 1 x daily - 7 x weekly - 3-5 min  hold    Treatment:    Therapeutic Activities: x 6min   Standing split squat: 1 x 10 (B) in parallel bars   Squats: 2 x 10     Therapeutic Exercise: x 14min   PROM - knee extension with overpressure and heel lift x 10 (L)   Standing DLHR x 20 - focusing on TKE  TKE with ball against wall: 2 x 10 x 3\" (L)   Elastic band lateral walk: 1 lap x 15 step (B), red theraband   Elastic band base position fwd walk: 1 lap x 15ft, red theraband   HEP update: Reviewed new HEP handout with new exercises and progressions, provided verbal and written instructions/cueing for proper technique and common errors/compensations as needed. Reviewed the importance of compliance with home exercise program to facilitate recovery and meet long-term goals.      Manual Therapy: x 20min  Soft tissue mobilization (prone): (L) hamstrings, calf, posterior knee   Soft tissue mobilization (supine): (L) quads, adductors, ITB/VL  Inf patellar joint mobs: Grade 3, 4 x 10\" (L)    Assessment: Duran is a 73 year old male that presents to physical therapy evaluation s/p (L) TKR 7/31/24 by Dr. Ferrer. The patient has been seen for 18 sessions in physical therapy for treatment including manual therapy for improved knee mobility and ROM as  well as exercises for progression in knee mobility, knee and hip strength, and overall functional strength. Duran has discontinued any use of an AD and is doing very well functionally, navigating stairs, ladders, and farm equipment regularly. He is very active and walks long distances regularly. His remaining complaints include stiffness in his knee after prolonged sitting, some remaining lack of knee extension mobility, and some increased soreness in his low back and hip likely due to compensatory movements while his knee is still recovering. At this time, the patient is comfortable with progression to discharge to self-management with [facility].    Goals:   Short-Term Goals:  Patient will improve knee flexion AROM to 120deg to allow for reciprocal stair navigation pattern for community integration. Timeframe: 12-14 visits. (PARTIALLY MET 10/29/24)  The patient will improve quad strength to facilitate walking with a SP cane for daily ambulation. Timeframe: 8 visits. (MET 9/18/24)  The patient will improve LE strength and endurance to facilitate standing for extended periods of time for 2 hours daily for household and community ambulation,. Timeframe: 8 visits. (MET 9/18/24)  Patient will improve knee strength and stability to facilitate discontinued use of AD for daily ambulation. Timeframe: 12 visits. (MET 10/29/24)  Long-Term Goals:  Patient will improve knee extension AROM to 0deg to facilitate TKE at heel strike for normalized gait pattern. Timeframe: 16 visits. (PARTIALLY MET 10/29/24)  The patient will improve LE strength and endurance to facilitate walking distances of 2 mile(s) daily for household and community ambulation. Timeframe: 16 visits. (MET 10/29/24)  Patient will improve lower motor control to perform double-leg squat with symmetrical loading, without asymmetries, and with proper posterior chain loading to facilitate squatting and lifting ADL's. Timeframe: 16 visits. (MET 10/29/24)  Patient will  improve LE mobility, strength, and single-leg stabilization to meet strength requirements for reciprocal stair navigation pattern with no asymmetries for ascending and descending 5 flights of stairs daily for household and community ambulation. Timeframe: 20 visits. (MET 10/29/24)  Patient will improve LEFS score by at least 9 points to indicate a true change in improved function for ADL's and restoring PLF. Timeframe: 20 visits. (IN PROGRESS 9/18/24)    Plan: Patient will be discharged from therapy at this time to continue with HEP.     Charges: MT x 1, Therex x 2  Total Timed Treatment: 40min    Total Treatment Time: 40min

## 2024-10-31 ENCOUNTER — APPOINTMENT (OUTPATIENT)
Dept: PHYSICAL THERAPY | Age: 74
End: 2024-10-31
Attending: ORTHOPAEDIC SURGERY
Payer: MEDICARE

## 2024-11-06 ENCOUNTER — APPOINTMENT (OUTPATIENT)
Dept: PHYSICAL THERAPY | Age: 74
End: 2024-11-06
Attending: ORTHOPAEDIC SURGERY
Payer: MEDICARE

## 2024-11-08 ENCOUNTER — APPOINTMENT (OUTPATIENT)
Dept: PHYSICAL THERAPY | Age: 74
End: 2024-11-08
Attending: ORTHOPAEDIC SURGERY
Payer: MEDICARE

## 2024-11-12 ENCOUNTER — HOSPITAL ENCOUNTER (OUTPATIENT)
Age: 74
Discharge: HOME | End: 2024-11-12
Payer: MEDICARE

## 2024-11-12 ENCOUNTER — APPOINTMENT (OUTPATIENT)
Dept: CT IMAGING | Age: 74
End: 2024-11-12
Attending: NURSE PRACTITIONER
Payer: MEDICARE

## 2024-11-12 VITALS
RESPIRATION RATE: 20 BRPM | SYSTOLIC BLOOD PRESSURE: 178 MMHG | DIASTOLIC BLOOD PRESSURE: 83 MMHG | HEART RATE: 70 BPM | OXYGEN SATURATION: 98 % | TEMPERATURE: 97 F

## 2024-11-12 DIAGNOSIS — S01.81XA FACIAL LACERATION, INITIAL ENCOUNTER: ICD-10-CM

## 2024-11-12 DIAGNOSIS — S09.90XA INJURY OF HEAD, INITIAL ENCOUNTER: Primary | ICD-10-CM

## 2024-11-12 PROCEDURE — 99202 OFFICE O/P NEW SF 15 MIN: CPT | Performed by: NURSE PRACTITIONER

## 2024-11-12 PROCEDURE — 12013 RPR F/E/E/N/L/M 2.6-5.0 CM: CPT | Performed by: NURSE PRACTITIONER

## 2024-11-12 PROCEDURE — 70450 CT HEAD/BRAIN W/O DYE: CPT | Performed by: NURSE PRACTITIONER

## 2024-11-12 NOTE — ED INITIAL ASSESSMENT (HPI)
Pt tripped and fell face first into asphalt. Unable to break his fall with is hands. Large lac to forehead and multiple abrasions to nose. No loc. States he saw a lot of stars though.    English

## 2024-11-12 NOTE — ED PROVIDER NOTES
Patient Seen in: [facility]      History     Chief Complaint   Patient presents with    Laceration     Stated Complaint: Nose, and forehead injury, says he needs stitches    Subjective:   73-year-old male presents today with history of trip and fall which she struck his head and face on pavement.  Denies any LOC no nausea vomiting.  Has multiple abrasions and laceration to the forehead and nose.  The patient is alert orientated x 3.  Bleeding is active.  No other injuries or concerns.  The patient's medication list, past medical history and social history elements as listed in today's nurse's notes were reviewed and agreed (except as otherwise stated in the HPI).  The patient's family history reviewed and determined to be noncontributory to the presenting problem              Objective:     Past Medical History:    Diverticulosis    DJD (degenerative joint disease), lumbar    Fatty liver    Seen on liver u/s    H. pylori infection    Hepatic cyst    Hyperlipemia    Lung nodule    L PUILMONARY NODULE    Nephrolithiasis              Past Surgical History:   Procedure Laterality Date    Colonoscopy  2014     Dr. Mccord, diverticulosis    Inguinal herniorrhaphy      BILAT W/REVISIONS    Total knee arthroplasty Left 07/31/2024    Dr. Ferrer                Social History     Socioeconomic History    Marital status:    Occupational History    Occupation: RETIRED   Tobacco Use    Smoking status: Never    Smokeless tobacco: Never    Tobacco comments:     CURRENT NON SMOKER   Vaping Use    Vaping status: Never Used   Substance and Sexual Activity    Alcohol use: Yes     Alcohol/week: 0.0 standard drinks of alcohol     Comment: 12 beers per week    Drug use: No              Review of Systems    Positive for stated complaint: Nose, and forehead injury, says he needs stitches  Other systems are as noted in HPI.  Constitutional and vital signs reviewed.      All other systems reviewed and negative except as  noted above.    Physical Exam     ED Triage Vitals [11/12/24 1104]   BP (!) 178/83   Pulse 70   Resp 20   Temp 97 °F (36.1 °C)   Temp src Temporal   SpO2 98 %   O2 Device None (Room air)       Current Vitals:   Vital Signs  BP: (!) 178/83  Pulse: 70  Resp: 20  Temp: 97 °F (36.1 °C)  Temp src: Temporal    Oxygen Therapy  SpO2: 98 %  O2 Device: None (Room air)        Physical Exam  Vitals and nursing note reviewed.   Constitutional:       Appearance: Normal appearance.   HENT:      Head:      Comments: 4 cm vertical laceration to the forehead with irregular borders and surrounding abrasions.    1 cm laceration to the left side to the Ala of the nose     Mouth/Throat:      Mouth: Mucous membranes are moist.   Cardiovascular:      Rate and Rhythm: Normal rate.   Pulmonary:      Effort: Pulmonary effort is normal.   Skin:     General: Skin is warm and dry.   Neurological:      General: No focal deficit present.      Mental Status: He is alert and oriented to person, place, and time.      GCS: GCS eye subscore is 4. GCS verbal subscore is 5. GCS motor subscore is 6.      Cranial Nerves: No facial asymmetry.      Sensory: Sensation is intact.      Motor: Motor function is intact.      Coordination: Coordination is intact.      Gait: Gait is intact.             ED Course   Labs Reviewed - No data to display                MDM     Please note that this report has been produced using speech recognition software and may contain errors related to that system including, but not limited to, errors in grammar, punctuation, and spelling, as well as words and phrases that possibly may have been recognized inappropriately.  If there are any questions or concerns, contact the dictating provider for clarification.              Medical Decision Making  Differential diagnosis includes but is not limited to: Facial laceration, minor head injury, concussion, ICB, cranial fracture      Presented today with history of trip and fall in which  he hit his forehead and bridge of nose on the concrete.  No loss of conscious, no nausea vomiting.  Does have a large laceration with surrounding abrasion to the forehead and to the distal portion of the nose.  Also has an avulsion to the bridge of the nose.  Does have oozing bleeding to both areas.  Last Tdap was  did offer but patient declines at this time.  Wound was irrigated and sutures were placed see procedure note.  CT scan of brain showed no fracture or acute findings.  No ICB.  Sutures to be removed in 5 days.  Wound care instructions were given.  Minor head injury precautions and instructions were given.  To go drug to the ER with any neurodeficits uncontrolled worsening headache, vomiting, or any worsening of symptoms.  Patient verbalized understanding and agreed to plan of care    Procedure: Suturing/Laceration repair  Procedure note:  Verbal consent was obtained from the patient/parent. Patient's name,  and site of procedure was confirmed  Wound was washed thoroughly and explored for any foreign bodies. No foreign bodies identified  Anesthetic used: 1 % Lidocaine  Type of anesthesia: local  Type of suture material: 5.0 Prolene  Suturing technique: simple interrupted   Number of sutures: Total 10.  Forehead-8.  Nose-2  Result: wound approximated well  Patient tolerated procedure well  Wound was dressed with Neosporin ointment and gauze dressing applied  Tetanus status: Patient declines at this time  Antiobitic prophylaxis: None  Suture removal in 5 days  Wound care instructions given. Keep affected area keep and clean  Monitor for infection  Return to clinic if infection suspected  All results reviewed and discussed with patient.  See AVS for detailed discharge instructions for your condition today.     Amount and/or Complexity of Data Reviewed  Radiology: ordered. Decision-making details documented in ED Course.     Details: Plain brain    Risk  OTC drugs.        Disposition and Plan      Clinical Impression:  1. Injury of head, initial encounter    2. Facial laceration, initial encounter         Disposition:  Discharge  11/12/2024 12:25 pm    Follow-up:  [facility] [address] [address] [phone]  In 5 days  For suture removal          Medications Prescribed:  Current Discharge Medication List              Supplementary Documentation:

## 2024-11-13 ENCOUNTER — APPOINTMENT (OUTPATIENT)
Dept: PHYSICAL THERAPY | Age: 74
End: 2024-11-13
Attending: ORTHOPAEDIC SURGERY
Payer: MEDICARE

## 2024-11-15 ENCOUNTER — APPOINTMENT (OUTPATIENT)
Dept: PHYSICAL THERAPY | Age: 74
End: 2024-11-15
Attending: ORTHOPAEDIC SURGERY
Payer: MEDICARE

## 2024-11-17 ENCOUNTER — HOSPITAL ENCOUNTER (OUTPATIENT)
Age: 74
Discharge: HOME | End: 2024-11-17
Payer: MEDICARE

## 2024-11-17 VITALS
OXYGEN SATURATION: 99 % | WEIGHT: 158 LBS | HEIGHT: 69 IN | TEMPERATURE: 98 F | BODY MASS INDEX: 23.4 KG/M2 | SYSTOLIC BLOOD PRESSURE: 156 MMHG | HEART RATE: 79 BPM | RESPIRATION RATE: 18 BRPM | DIASTOLIC BLOOD PRESSURE: 94 MMHG

## 2024-11-17 DIAGNOSIS — Z48.02 ENCOUNTER FOR REMOVAL OF SUTURES: Primary | ICD-10-CM

## 2024-11-17 NOTE — ED PROVIDER NOTES
Patient Seen in: [facility]      History     Chief Complaint   Patient presents with    Suture Removal     Stated Complaint: Staple removal    Subjective:   73-year-old male presents today with need of suture hold from forehead and nose.  Suture placement 5 days ago.  Wound is scabbed over.  No surrounding redness or swelling.  Otherwise states healing well.  Denies any headaches dizziness no neurodeficits.  Alert orientated x 3.  No other symptoms or concerns.  The patient's medication list, past medical history and social history elements as listed in today's nurse's notes were reviewed and agreed (except as otherwise stated in the HPI).  The patient's family history reviewed and determined to be noncontributory to the presenting problem              Objective:     Past Medical History:    Diverticulosis    DJD (degenerative joint disease), lumbar    Fatty liver    Seen on liver u/s    H. pylori infection    Hepatic cyst    Hyperlipemia    Lung nodule    L PUILMONARY NODULE    Nephrolithiasis              Past Surgical History:   Procedure Laterality Date    Colonoscopy  2014     Dr. Mccord, diverticulosis    Inguinal herniorrhaphy      BILAT W/REVISIONS    Total knee arthroplasty Left 07/31/2024    Dr. Ferrer                Social History     Socioeconomic History    Marital status:    Occupational History    Occupation: RETIRED   Tobacco Use    Smoking status: Never    Smokeless tobacco: Never    Tobacco comments:     CURRENT NON SMOKER   Vaping Use    Vaping status: Never Used   Substance and Sexual Activity    Alcohol use: Yes     Alcohol/week: 0.0 standard drinks of alcohol     Comment: 12 beers per week    Drug use: No              Review of Systems    Positive for stated complaint: Staple removal  Other systems are as noted in HPI.  Constitutional and vital signs reviewed.      All other systems reviewed and negative except as noted above.    Physical Exam     ED Triage Vitals [11/17/24  0928]   BP (!) 156/94   Pulse 79   Resp 18   Temp 98 °F (36.7 °C)   Temp src Temporal   SpO2 99 %   O2 Device None (Room air)       Current Vitals:   Vital Signs  BP: (!) 156/94  Pulse: 79  Resp: 18  Temp: 98 °F (36.7 °C)  Temp src: Temporal    Oxygen Therapy  SpO2: 99 %  O2 Device: None (Room air)        Physical Exam  Vitals and nursing note reviewed.   Constitutional:       Appearance: Normal appearance.   HENT:      Head:      Comments: Scabbed over wound to forehead and left distal nose.  Sutures are in place some embedded in the scab.  No surrounding redness swelling no pus drainage.  Cardiovascular:      Rate and Rhythm: Normal rate.   Pulmonary:      Effort: Pulmonary effort is normal.   Skin:     General: Skin is warm and dry.   Neurological:      Mental Status: He is alert and oriented to person, place, and time.             ED Course   Labs Reviewed - No data to display                MDM     Please note that this report has been produced using speech recognition software and may contain errors related to that system including, but not limited to, errors in grammar, punctuation, and spelling, as well as words and phrases that possibly may have been recognized inappropriately.  If there are any questions or concerns, contact the dictating provider for clarification.              Medical Decision Making  Differential diagnosis includes but is not limited to: Removal, secondary infection, cellulitis      Presents today with need of suture removal from forehead and nose.  Sutures were placed 5 days prior.  Has not had any headaches dizziness or neurodeficits since injury.  Sutures were embedded within the scab of the nose and forehead.  Peroxide with saline gauze was placed and area cleaned.  Sutures were removed.  Wound care reinforced.  To continue watch for any signs infection of this happens may return here to the immediate care or go to the primary care physician.  Patient verbalized understanding and  agreed to plan of care.    Risk  OTC drugs.        Disposition and Plan     Clinical Impression:  1. Encounter for removal of sutures         Disposition:  Discharge  11/17/2024 10:14 am    Follow-up:  Ashish Stokes MD  [address] [place] [address]  [phone]      As needed          Medications Prescribed:  Current Discharge Medication List              Supplementary Documentation:

## 2024-11-17 NOTE — DISCHARGE INSTRUCTIONS
As we discussed wash area with warm soapy water at least once a day.  May apply antibiotic ointment at night.  Watch for any redness swelling or pus drainage if this happens return to immediate care.  If you do notice any other blue sutures that was not seen due to your scabbing over then please return here to make care and we will take to the lab for you

## 2025-03-13 RX ORDER — ROSUVASTATIN CALCIUM 5 MG/1
5 TABLET, COATED ORAL NIGHTLY
Qty: 90 TABLET | Refills: 0 | Status: SHIPPED | OUTPATIENT
Start: 2025-03-13

## 2025-03-19 ENCOUNTER — OFFICE VISIT (OUTPATIENT)
Dept: FAMILY MEDICINE CLINIC | Facility: CLINIC | Age: 75
End: 2025-03-19
Payer: MEDICARE

## 2025-03-19 VITALS
DIASTOLIC BLOOD PRESSURE: 88 MMHG | OXYGEN SATURATION: 96 % | TEMPERATURE: 99 F | HEART RATE: 89 BPM | WEIGHT: 154.81 LBS | SYSTOLIC BLOOD PRESSURE: 138 MMHG | HEIGHT: 69 IN | BODY MASS INDEX: 22.93 KG/M2

## 2025-03-19 DIAGNOSIS — E78.5 HYPERLIPIDEMIA, UNSPECIFIED HYPERLIPIDEMIA TYPE: ICD-10-CM

## 2025-03-19 DIAGNOSIS — Z12.5 PROSTATE CANCER SCREENING: ICD-10-CM

## 2025-03-19 DIAGNOSIS — S81.812A LACERATION OF LEFT LOWER EXTREMITY, INITIAL ENCOUNTER: ICD-10-CM

## 2025-03-19 DIAGNOSIS — Z00.00 MEDICARE ANNUAL WELLNESS VISIT, SUBSEQUENT: Primary | ICD-10-CM

## 2025-03-19 DIAGNOSIS — K21.9 GERD WITHOUT ESOPHAGITIS: ICD-10-CM

## 2025-03-19 DIAGNOSIS — S40.861A TICK BITE OF RIGHT UPPER ARM, INITIAL ENCOUNTER: ICD-10-CM

## 2025-03-19 DIAGNOSIS — D72.829 LEUKOCYTOSIS, UNSPECIFIED TYPE: ICD-10-CM
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
## 2025-03-19 LAB
ALT SERPL-CCNC: 24 U/L
ANION GAP SERPL CALC-SCNC: 9 MMOL/L (ref 0–18)
AST SERPL-CCNC: 32 U/L (ref ?–34)
BUN BLD-MCNC: 17 MG/DL (ref 9–23)
CALCIUM BLD-MCNC: 9.8 MG/DL (ref 8.7–10.6)
CHLORIDE SERPL-SCNC: 104 MMOL/L (ref 98–112)
CHOLEST SERPL-MCNC: 165 MG/DL (ref ?–200)
CO2 SERPL-SCNC: 30 MMOL/L (ref 21–32)
COMPLEXED PSA SERPL-MCNC: 1.34 NG/ML (ref ?–4)
CREAT BLD-MCNC: 1.18 MG/DL
EGFRCR SERPLBLD CKD-EPI 2021: 64 ML/MIN/1.73M2 (ref 60–?)
ERYTHROCYTE [DISTWIDTH] IN BLOOD BY AUTOMATED COUNT: 19.3 %
FASTING PATIENT LIPID ANSWER: YES
FASTING STATUS PATIENT QL REPORTED: YES
GLUCOSE BLD-MCNC: 91 MG/DL (ref 70–99)
HCT VFR BLD AUTO: 49 %
HDLC SERPL-MCNC: 69 MG/DL (ref 40–59)
HGB BLD-MCNC: 15.7 G/DL
LDLC SERPL CALC-MCNC: 84 MG/DL (ref ?–100)
MCH RBC QN AUTO: 27.1 PG (ref 26–34)
MCHC RBC AUTO-ENTMCNC: 32 G/DL (ref 31–37)
MCV RBC AUTO: 84.6 FL
NONHDLC SERPL-MCNC: 96 MG/DL (ref ?–130)
OSMOLALITY SERPL CALC.SUM OF ELEC: 297 MOSM/KG (ref 275–295)
PLATELET # BLD AUTO: 287 10(3)UL (ref 150–450)
POTASSIUM SERPL-SCNC: 4.4 MMOL/L (ref 3.5–5.1)
RBC # BLD AUTO: 5.79 X10(6)UL
SODIUM SERPL-SCNC: 143 MMOL/L (ref 136–145)
TRIGL SERPL-MCNC: 59 MG/DL (ref 30–149)
TSI SER-ACNC: 1.02 UIU/ML (ref 0.55–4.78)
VLDLC SERPL CALC-MCNC: 9 MG/DL (ref 0–30)
WBC # BLD AUTO: 14.3 X10(3) UL (ref 4–11)

## 2025-03-19 PROCEDURE — 84443 ASSAY THYROID STIM HORMONE: CPT | Performed by: FAMILY MEDICINE

## 2025-03-19 PROCEDURE — 80061 LIPID PANEL: CPT | Performed by: FAMILY MEDICINE

## 2025-03-19 PROCEDURE — G0439 PPPS, SUBSEQ VISIT: HCPCS | Performed by: FAMILY MEDICINE

## 2025-03-19 PROCEDURE — 84450 TRANSFERASE (AST) (SGOT): CPT | Performed by: FAMILY MEDICINE

## 2025-03-19 PROCEDURE — 90714 TD VACC NO PRESV 7 YRS+ IM: CPT | Performed by: FAMILY MEDICINE

## 2025-03-19 PROCEDURE — 90471 IMMUNIZATION ADMIN: CPT | Performed by: FAMILY MEDICINE

## 2025-03-19 PROCEDURE — 84460 ALANINE AMINO (ALT) (SGPT): CPT | Performed by: FAMILY MEDICINE

## 2025-03-19 PROCEDURE — 80048 BASIC METABOLIC PNL TOTAL CA: CPT | Performed by: FAMILY MEDICINE

## 2025-03-19 PROCEDURE — 85027 COMPLETE CBC AUTOMATED: CPT | Performed by: FAMILY MEDICINE

## 2025-03-19 PROCEDURE — 87476 LYME DIS DNA AMP PROBE: CPT | Performed by: FAMILY MEDICINE

## 2025-03-19 NOTE — PROGRESS NOTES
Duran Harry Jr. is a 74 year old male.    CC:    Chief Complaint   Patient presents with    Physical     MAW.        HPI:  Yearly PX    Last PSA:   Recent Results (from the past [id number] hours)   PSA Total, Screen    Collection Time: 02/13/24  9:32 AM   Result Value Ref Range    Prostate Specific Antigen Screen 2.07 <=4.00 ng/mL     *Note: Due to a large number of results and/or encounters for the requested time period, some results have not been displayed. A complete set of results can be found in Results Review.        Last lipid:  Lab Results   Component Value Date    CHOLEST 190 02/13/2024    TRIG 51 02/13/2024    HDL 82 (H) 02/13/2024    LDL 98 02/13/2024    VLDL 8 02/13/2024    TCHDLRATIO 6.87 (H) 12/19/2017    NONHDLC 108 02/13/2024       Last Colon Cancer screening: na      Immunization History   Administered Date(s) Administered    TDAP 05/11/2009   Deferred Date(s) Deferred    Pneumococcal (Prevnar 13) 07/26/2017       Patient Active Problem List   Diagnosis    Hyperlipidemia: Crestor, due for labs    GERD without esophagitis: PPI works well, no dysphagia      He is frustrated with the lack of progress he has had after the L TKA last summer. The knee does not hurt but does not feel right. He has completed PT. He has an appt with his Ortho in the next few months    He sustained a laceration to his L leg from a sharp metal object yesterday.    He is worries about Lyme disease as he had a tic bite that encouraged last Fall to the R arm    General Health     In the past six months, have you lost more than 10 pounds without trying?: 2 - No    Has your appetite been poor?: No    Type of Diet: Balanced    How does the patient maintain a good energy level?: Other    How would you describe your daily physical activity?: Moderate    How would you describe your current health state?: Good    How do you maintain positive mental well-being?: Social Interaction, Visiting Friends, Visiting Family         Have you had  any immunizations at another office such as Influenza, Hepatitis B, Tetanus, or Pneumococcal?: No     Functional Ability     Bathing or Showering: Able without help    Toileting: Able without help    Dressing: Able without help    Eating: Able without help    Driving: Able without help    Preparing your meals: Able without help    Managing money/bills: Able without help    Taking medications as prescribed: Able without help    Are you able to afford your medications?: Yes    Hearing Problems?: No     Functional Status     Hearing Problems?: No    Vision Problems? : Yes    Difficulty walking?: Yes    Difficulty dressing or bathing?: No    Problems with daily activities? : No    Memory Problems?: No      Fall Screen: See Flow sheet  Fall Risk Assessment:   He has been screened for Falls and is High Risk. Fall Prevention information provided to patient in After Visit Summary.    Do you feel unsteady when standing or walking?: No  Do you worry about falling?: No  Have you fallen in the past year?: Yes  How many times have you fallen?: 1  Were you injured?: Yes       PHQ-2 SCORE: 0  , done 3/19/2025           Advance Directives     Do you have a healthcare power of ?: Yes    Do you have a living will?: Yes     Hearing Assessment (Required for AWV/SWV)      Hearing Screening    Screening Method: Finger Rub  Finger Rub Result: Pass         Visual Acuity     Right Eye Visual Acuity: Corrected Left Eye Visual Acuity: Corrected   Right Eye Chart Acuity: 20/30 Left Eye Chart Acuity: 20/25     Cognitive Assessment     What day of the week is this?: Correct    What month is it?: Correct    What year is it?: Correct    Recall \"Ball\": Correct    Recall \"Flag\": Correct    Recall \"Tree\": Correct         Allergies as of 03/19/2025 - Review Complete 03/19/2025   Allergen Reaction Noted    Demerol OTHER (SEE COMMENTS) 08/08/2007        Current Meds:  Current Outpatient Medications   Medication Sig Dispense Refill     rosuvastatin 5 MG Oral Tab Take 1 tablet (5 mg total) by mouth nightly. 90 tablet 0    acetaminophen 500 MG Oral Tab Take 2 tablets (1,000 mg total) by mouth 3 (three) times daily. 90 tablet 0    pantoprazole 40 MG Oral Tab EC Take 1 tablet (40 mg total) by mouth daily as needed. 90 tablet 3    Zinc 100 MG Oral Tab Take by mouth daily.          History:  Past Medical History:    Diverticulosis    DJD (degenerative joint disease), lumbar    Fatty liver    Seen on liver u/s    H. pylori infection    Hepatic cyst    Hyperlipemia    Lung nodule    L PUILMONARY NODULE    Nephrolithiasis      Past Surgical History:   Procedure Laterality Date    Colonoscopy       Dr. Mccord, diverticulosis    Inguinal herniorrhaphy      BILAT W/REVISIONS    Total knee arthroplasty Left 2024    Dr. Ferrer      Family History   Problem Relation Age of Onset    Other (PSYCHIATRIC PROBLEMS [Other]) Mother     Other (CROHN'S [Other]) Father     Other (NEPHROLITIASIS [Other]) Father       Family Status   Relation Status    Fa  at age 69        CVA    Mo  at age 78        DEMENTIA      Social History     Socioeconomic History    Marital status:    Occupational History    Occupation: RETIRED   Tobacco Use    Smoking status: Never    Smokeless tobacco: Never    Tobacco comments:     CURRENT NON SMOKER   Vaping Use    Vaping status: Never Used   Substance and Sexual Activity    Alcohol use: Yes     Alcohol/week: 0.0 standard drinks of alcohol     Comment: 12 beers per week    Drug use: No        ROS:  General: lower energy      Vitals: /88   Pulse 89   Temp 98.9 °F (37.2 °C) (Temporal)   Ht 5' 9\" (1.753 m)   Wt 154 lb 12.8 oz (70.2 kg)   SpO2 96%   BMI 22.86 kg/m²    Reviewed by CLARY Stokes M.D.    Physical Exam:  GEN: well developed, well nourished, in no apparent distress  EYE: B conjunctiva and lids normal  HENT: normocephalic; normal nose, pharynx and TM's  NECK: No lymphadenopathy, thyromegaly or  masses  CAR: S1, S2 normal, RRR; no S3, no S4; no click; murmur negative  PULM: clear to auscultation B, no accessory muscle use  GI: normal active BS+, soft, nondistended; no HSM; no masses; no bruits; no masses; nontender, no G/R/R   PSYCH: alert and oriented x 3; affect appropriate  SKIN: L lateral shin with a 5 inch, superficial laceration that is not active bleeding   EXTREMITIES: No clubbing, cyanosis or edema  GENITAL: not examined  LYMPH: no supraclavicular nodes  NEURO: Awake and alert. Normal speech and articulation. No facial droop or asymmetry. Moving all extremities equally.    ASSESSMENT AND PLAN    1. Medicare annual wellness visit, subsequent  Defers all other immunizations other then Td  Await results     - ALT(SGPT)  - AST (SGOT)  - Basic Metabolic Panel (8)  - CBC, Platelet; No Differential  - PSA Total, Screen  - Lipid Panel  - TSH W Reflex To Free T4  - Lyme Disease(B.Burgdorferi)Pcr [E]    2. Laceration of left lower extremity, initial encounter  No intervention other then Td  - Td (Tenivac) [73473]    3. Tick bite of right upper arm, initial encounter  Await results     - Lyme Disease(B.Burgdorferi)Pcr [E]    4. Prostate cancer screening  Await results     - PSA Total, Screen    5. Hyperlipidemia, unspecified hyperlipidemia type  Stable   Continue present medications   Await results     - ALT(SGPT)  - AST (SGOT)  - Lipid Panel    6. GERD without esophagitis  Currently stable and controlled with the current treatment plan. Continue present management.         - CBC, Platelet; No Differential; Future  - CBC, Platelet; No Differential      No follow-ups on file.    Orders for this visit:    Orders Placed This Encounter   Procedures    ALT(SGPT)     Standing Status:   Future     Number of Occurrences:   1     Standing Expiration Date:   3/19/2026    AST (SGOT)     Standing Status:   Future     Number of Occurrences:   1     Standing Expiration Date:   3/19/2026    Basic Metabolic Panel (8)      Standing Status:   Future     Number of Occurrences:   1     Standing Expiration Date:   3/19/2026    CBC, Platelet; No Differential     Standing Status:   Future     Number of Occurrences:   1     Standing Expiration Date:   3/19/2026    PSA Total, Screen     Standing Status:   Future     Number of Occurrences:   1     Standing Expiration Date:   3/19/2026    Lipid Panel     Standing Status:   Future     Number of Occurrences:   1     Standing Expiration Date:   3/19/2026    TSH W Reflex To Free T4     Standing Status:   Future     Number of Occurrences:   1     Standing Expiration Date:   3/19/2026    Lyme Disease(B.Burgdorferi)Pcr [E]     Standing Status:   Future     Number of Occurrences:   1     Standing Expiration Date:   3/19/2026     Order Specific Question:   Source     Answer:   Blood     Order Specific Question:   Release to patient     Answer:   Immediate    Td (Tenivac) [56523]    VENIPUNCTURE     Order Specific Question:   Release to patient     Answer:   Immediate       TETANUS AND DIPHTHERIA, PRESERVE FREE    Meds & Refills for this Visit:  Requested Prescriptions      No prescriptions requested or ordered in this encounter             Authorized by Ashish Stokes M.D.

## 2025-03-22 LAB — LYME PCR: NEGATIVE

## 2025-05-23 ENCOUNTER — ORDER TRANSCRIPTION (OUTPATIENT)
Dept: PHYSICAL THERAPY | Age: 75
End: 2025-05-23

## 2025-05-23 DIAGNOSIS — Z96.652 STATUS POST TOTAL KNEE REPLACEMENT, LEFT: Primary | ICD-10-CM

## 2025-06-06 ENCOUNTER — OFFICE VISIT (OUTPATIENT)
Dept: PHYSICAL THERAPY | Age: 75
End: 2025-06-06
Attending: ORTHOPAEDIC SURGERY
Payer: MEDICARE

## 2025-06-06 DIAGNOSIS — Z96.652 STATUS POST TOTAL KNEE REPLACEMENT, LEFT: Primary | ICD-10-CM

## 2025-06-06 PROCEDURE — 97161 PT EVAL LOW COMPLEX 20 MIN: CPT

## 2025-06-06 PROCEDURE — 97110 THERAPEUTIC EXERCISES: CPT

## 2025-06-06 NOTE — PROGRESS NOTES
LOWER EXTREMITY EVALUATION:     Diagnosis:   Status post total knee replacement, left (Z96.652) Patient:  Duran Harry Jr. (74 year old, male)        Referring Provider: Parish Haddad  Today's Date   6/6/2025    Precautions:      Date of Evaluation: 06/06/25  Next MD visit: No data recorded  Date of Surgery: 7/31/24     PATIENT SUMMARY     Summary of chief complaints: stiffness, swelling, difficulty sitting  History of current condition: Patient states his knee is \"slowing him down\". Surgery was last year in summer. [place], on feet from 7-6 everyday, frequent stair use. Worst activity is sitting >1hr, such as w/ long driving. He has been on plane rides and long car rides since last year. Difficult to kneel on knee. Feels like his gait is still impaired, sometimes feels like he is shuffling. Did have a fall in Nov but this was because he tripped over a wire that he did not see it. Wants to improve overall knee function.   Pain level: current 2 /10, at best 0 /10, at worst 2 /10  Description of symptoms: aching, still gets swelling around knee, restricted ROM   Occupation: works on farm   Leisure activities/Hobbies:     Prior level of function:    Current limitations: squatting, lunging, kneeling, walking, prolonged sitting  Pt goals: improve knee mobility, improve knee strength, improve gait and general mobility  Past medical history was reviewed with Duran.  Significant findings include:    Imaging/Tests:     Duran  has a past medical history of Diverticulosis, DJD (degenerative joint disease), lumbar (08/13/2007), Fatty liver (09/2019), H. pylori infection, Hepatic cyst (10/02/2009), Hyperlipemia (08/08/2007), Lung nodule (10/02/2009), and Nephrolithiasis (10/02/2009).  He  has a past surgical history that includes inguinal herniorrhaphy; colonoscopy (2014); and Total knee arthroplasty (Left, 07/31/2024).    ASSESSMENT  Duran presents to physical therapy evaluation with primary c/o stiffness,  swelling, difficulty sitting. The results of the objective tests and measures show  . Functional deficits include but are not limited to squatting, lunging, kneeling, walking, prolonged sitting. Signs and symptoms are consistent with diagnosis of Status post total knee replacement, left (Z96.652). Pt and PT discussed evaluation findings, pathology, POC and HEP.  Pt voiced understanding and performs HEP correctly without reported pain. Skilled Physical Therapy is medically necessary to address the above impairments and reach functional goals.      Patient limited most by lack of knee ext ROM, which alters gait mechanics. Requires PT for aggressive mobilization and reinforcement of mobility increase w/ strengthening in new range to solidify improvement.    OBJECTIVE:      Musculoskeletal  Observation: mild swelling about L knee joint  Palpation: no significant TTP about knee or LLE musculature   Accessory Motion:       Edema:     Special Tests:      ROM and Strength: (* denotes performed with pain)  Knee ROM   L - Active R - Active   Extension -18 -2   Flexion 133 140       Lower Extremity Strength   LEFT RIGHT   Hip Flexion 4 4   Hip Extension 4 4+   Hip ABD 4 4   Hip ADD 4+ 4+   Hip IR @ 90 deg flexion 4 4   Hip ER @ 90 deg flexion 4- 4   Knee Extension 4 5   Knee Flexion 4 5   Ankle DF 5 5   Ankle PF 5 5   Ankle INV 5 5   Ankle EVER 5 5        Balance and Functional Mobility:  -gait quality: patient w/ decreased step length of L, lacking knee extension on end-stage swing-through  -squat: descends to ~74 deg of knee flexion; lateral shift is R; feels limitation from stiffness  -lunge to floor: able to complete w/ UE assist on table, prefers L ft fwd to prevent   -stairs: ascends and descends reciprocally, but early heel off on L during descent due to difficult w/ knee flexion control eccentrically  -6MWT: 1320 feet, 0 rest breaks, no assistive device     Today's Treatment and Response:   Pt education was provided on  exam findings, treatment diagnosis, treatment plan, expectations, and prognosis.    Today's Treatment       6/6/2025   LE Treatment   Therapeutic Exercise Minutes 18   Evaluation Minutes 20   Total Time Of Timed Procedures 18   Total Time Of Service-Based Procedures 20   Total Treatment Time 38   HEP Access Code: [email or web address]  URL: [email or web address]  Date: 06/06/2025  Prepared by: Krystian Horton    Exercises  - Prone Knee Flexion AROM  - 2 x daily - 7 x weekly - 3 sets - 10 reps  - Seated Knee Extension Stretch with Chair  - 2 x daily - 7 x weekly - 10 hold        Patient was instructed in and issued a HEP for: Access Code: [email or web address]  URL: [email or web address]  Date: 06/06/2025  Prepared by: Krystian Horton    Exercises  - Prone Knee Flexion AROM  - 2 x daily - 7 x weekly - 3 sets - 10 reps  - Seated Knee Extension Stretch with Chair  - 2 x daily - 7 x weekly - 10 hold    Charges:  PT EVAL: Low Complexity, 1 TE, eval  In agreement with evaluation findings and clinical rationale, this evaluation involved LOW COMPLEXITY decision making due to no personal factors/comorbidities, 1-2 body structures involved/activity limitations, and stable symptoms as documented in the evaluation.                                                                                                                PLAN OF CARE:    Goals: (to be met in 12 visits)   STG (to be met by 7/9/25)  -Patient will increase L knee ext AROM to -9 degrees to improve performance of activity including walking, bending, crouching, stair traversal, and picking up objects.    LTG (to be met by 8/6/25)  -Patient will show symmetrical step length and stance time during gait for improved mechanics and energy efficiency.  -Patient will increase L knee ext AROM to -2 degrees to improve performance of activity including walking, bending, crouching, stair traversal, and picking up objects.  -Patient will be able to perform a double  leg squat to 90 deg knee flexion without lateral shift as evidence of improved movement capacity.  -Patient will be able to ascend and descend stairs reciprocally without significant pain or movement asymmetry to allow for normal household and community traversal.   -Patient will increase strength grade of all tested LE muscle groups to 5/5 to improve performance of activities requiring change of position or moving the body through space (position transfers, walking, running, jumping, stairs, etc).  -Patient will achieve a score on the Lower Extremity Functional Scale of 70% as evidence of improved functional capacity during everyday activity.  -Patient will demonstrate and/or verbalize competence in performance of advanced HEP to be able to progress exercise on their own following discharge from physical therapy.       Frequency / Duration: Patient will be seen 1-2x/week or a total of 12  visits over a 90 day period. Treatment will include: Manual Therapy; Neuromuscular Re-education; Therapeutic Exercise; Therapeutic Activities; Home Exercise Program instruction; Patient/Family Education    Education or treatment limitation: None   Rehab Potential: good     LEFS Score  LEFS Score: (Patient-Rptd) 78.75 % (6/6/2025  8:47 AM)      Patient/Family/Caregiver was advised of these findings, precautions, and treatment options and has agreed to actively participate in planning and for this course of care.    Thank you for your referral. Please co-sign or sign and return this letter via fax as soon as possible to [phone]. If you have any questions, please contact me at Dept: [phone]    Sincerely,  Electronically signed by therapist: Krystian Horton PT  Physician's certification required: Yes  I certify the need for these services furnished under this plan of treatment and while under my care.    X___________________________________________________ Date____________________    Certification From: 6/6/2025  To: 9/4/2025

## 2025-06-09 ENCOUNTER — OFFICE VISIT (OUTPATIENT)
Dept: PHYSICAL THERAPY | Age: 75
End: 2025-06-09
Attending: ORTHOPAEDIC SURGERY
Payer: MEDICARE

## 2025-06-09 PROCEDURE — 97140 MANUAL THERAPY 1/> REGIONS: CPT

## 2025-06-09 PROCEDURE — 97110 THERAPEUTIC EXERCISES: CPT

## 2025-06-09 NOTE — PROGRESS NOTES
Patient: Duran Harry Jr. (74 year old, male) Referring Provider:  Insurance:   Diagnosis: Status post total knee replacement, left (Z96.652) Parish Leeazal  MEDICARE   Date of Surgery: 7/31/24 Next MD visit:  MARIANO JACOB INDEMNITY   Precautions:    No data recorded Referral Information:    Date of Evaluation: Req: 0, Auth: 0, Exp:     06/06/25 POC Auth Visits:          Today's Date   6/9/2025    Subjective  Patient trialed HEP for knee ext and feels like it is helpful.       Pain: 0/10     Objective  end of session L knee ext -7              Assessment  Patient demonstrates intrasession improvement in knee ext ROM. Instructed in modifications that he can do to his HEP at home to make more effective, like hanging patella off edge of bed during prone knee flex/ext w/ stretch at bottom of each rep. Updated HEP to include LAQ w/ added ankle weight.        Interventions in today's session were performed with the goal of improving L knee mobility (ext). Clinical judgement and ongoing assessment were used to adjust intervention based on patient's response. Skilled PT guided patient through one-on-one treatment providing verbal, visual, tactile cues, guided exercise selection (type, volume, intensity, complexity), and appropriate rest breaks to maximize treatment effect. Treatment time includes time to complete interventions as well as time spent demonstrating proper technique and educating on effect/purpose of each intervention. The patient requires continued physical therapy treatment to achieve the current established goals. Answered all patient questions throughout session.     Goals (to be met in 12 visits)   STG (to be met by 7/9/25)  -Patient will increase L knee ext AROM to -9 degrees to improve performance of activity including walking, bending, crouching, stair traversal, and picking up objects.    LTG (to be met by 8/6/25)  -Patient will show symmetrical step length and stance time during gait for improved mechanics  and energy efficiency.  -Patient will increase L knee ext AROM to -2 degrees to improve performance of activity including walking, bending, crouching, stair traversal, and picking up objects.  -Patient will be able to perform a double leg squat to 90 deg knee flexion without lateral shift as evidence of improved movement capacity.  -Patient will be able to ascend and descend stairs reciprocally without significant pain or movement asymmetry to allow for normal household and community traversal.   -Patient will increase strength grade of all tested LE muscle groups to 5/5 to improve performance of activities requiring change of position or moving the body through space (position transfers, walking, running, jumping, stairs, etc).  -Patient will achieve a score on the Lower Extremity Functional Scale of 70% as evidence of improved functional capacity during everyday activity.  -Patient will demonstrate and/or verbalize competence in performance of advanced HEP to be able to progress exercise on their own following discharge from physical therapy.           Plan  continue aggressive mobilization and reinforce w/ active muscle contraciton at end ROMS to reinforce new mobility    Treatment Last 4 Visits  Treatment Day: 2       6/6/2025 6/9/2025   LE Treatment   Therapeutic Exercise  -stationary bike L4 5min  -SAQ 4x10x8#  -LAQ 1x10x8#   -prone knee extension off EOT 4x10x8#   Manual Therapy  -L heel prop knee ext overpressure stretch 15 min   Therapeutic Exercise Minutes 18 23   Manual Therapy Minutes  15   Evaluation Minutes 20    Total Time Of Timed Procedures 18 38   Total Time Of Service-Based Procedures 20 0   Total Treatment Time 38 38   HEP Access Code: [email or web address]  URL: [email or web address]  Date: 06/06/2025  Prepared by: Krystian Horton    Exercises  - Prone Knee Flexion AROM  - 2 x daily - 7 x weekly - 3 sets - 10 reps  - Seated Knee Extension Stretch with Chair  - 2 x daily - 7 x weekly - 10  hold Access Code: [email or web address]  URL: [email or web address]  Date: 06/09/2025  Prepared by: Krystian Horton    Exercises  - Prone Knee Flexion AROM  - 2 x daily - 7 x weekly - 3 sets - 10 reps  - Seated Knee Extension Stretch with Chair  - 2 x daily - 7 x weekly - 10 hold  - Seated Long Arc Quad  - 1 x daily - 7 x weekly - 3 sets - 10 reps - 5 hold        HEP  Access Code: [email or web address]  URL: [email or web address]  Date: 06/09/2025  Prepared by: Krystian Horton    Exercises  - Prone Knee Flexion AROM  - 2 x daily - 7 x weekly - 3 sets - 10 reps  - Seated Knee Extension Stretch with Chair  - 2 x daily - 7 x weekly - 10 hold  - Seated Long Arc Quad  - 1 x daily - 7 x weekly - 3 sets - 10 reps - 5 hold    Charges     1MT, 2TE

## 2025-06-12 ENCOUNTER — OFFICE VISIT (OUTPATIENT)
Dept: PHYSICAL THERAPY | Age: 75
End: 2025-06-12
Attending: ORTHOPAEDIC SURGERY
Payer: MEDICARE

## 2025-06-12 PROCEDURE — 97140 MANUAL THERAPY 1/> REGIONS: CPT

## 2025-06-12 PROCEDURE — 97110 THERAPEUTIC EXERCISES: CPT

## 2025-06-12 NOTE — PROGRESS NOTES
Patient: Duran Harry Jr. (74 year old, male) Referring Provider:  Insurance:   Diagnosis: Status post total knee replacement, left (Z96.652) [facility]  MEDICARE   Date of Surgery: 7/31/24 Next MD visit:  MARIANO JACOB INDEMNITY   Precautions:    No data recorded Referral Information:    Date of Evaluation: Req: 0, Auth: 0, Exp:     06/06/25 POC Auth Visits:          Today's Date   6/12/2025    Subjective  Patient states he is consistent w/ HEP. Feels his stiffness returns every morning after sleep.       Pain: 0/10     Objective  end of session L knee ext -7            Assessment  Patient able to improve knee ext (L) to -7 again after interventions from -16 at start of session. Updated HEP to include active knee ext and calf raise to target all soft tissue components restricted mobility. Will continue to aggressively mobilize knee into extension.    Goals (to be met in 12 visits)   STG (to be met by 7/9/25)  -Patient will increase L knee ext AROM to -9 degrees to improve performance of activity including walking, bending, crouching, stair traversal, and picking up objects.    LTG (to be met by 8/6/25)  -Patient will show symmetrical step length and stance time during gait for improved mechanics and energy efficiency.  -Patient will increase L knee ext AROM to -2 degrees to improve performance of activity including walking, bending, crouching, stair traversal, and picking up objects.  -Patient will be able to perform a double leg squat to 90 deg knee flexion without lateral shift as evidence of improved movement capacity.  -Patient will be able to ascend and descend stairs reciprocally without significant pain or movement asymmetry to allow for normal household and community traversal.   -Patient will increase strength grade of all tested LE muscle groups to 5/5 to improve performance of activities requiring change of position or moving the body through space (position transfers, walking, running, jumping, stairs,  etc).  -Patient will achieve a score on the Lower Extremity Functional Scale of 70% as evidence of improved functional capacity during everyday activity.  -Patient will demonstrate and/or verbalize competence in performance of advanced HEP to be able to progress exercise on their own following discharge from physical therapy.               Plan  Continue agressive manual mobilization into ext and reinforce w/ active exercise.    Treatment Last 4 Visits  Treatment Day: 3       6/6/2025 6/9/2025 6/12/2025   LE Treatment   Therapeutic Exercise  -stationary bike L4 5min  -SAQ 4x10x8#  -LAQ 1x10x8#   -prone knee extension off EOT 4x10x8# -stationary bike L5 6 min  -prone HS curl [id number] stretch hold bottom (L) (20sec stretch rest between sets)  -TKE ball on wall (L knee) [id number]  -PWB single leg calf raise pumps [id number] on/off   Manual Therapy  -L heel prop knee ext overpressure stretch 15 min -manual knee ext overpressure (L) 15 min   Therapeutic Exercise Minutes 18 23 23   Manual Therapy Minutes  15 15   Evaluation Minutes 20     Total Time Of Timed Procedures 18 38 38   Total Time Of Service-Based Procedures 20 0 0   Total Treatment Time 38 38 38   HEP Access Code: [email or web address]  URL: [email or web address]  Date: 06/06/2025  Prepared by: Krystian Horton    Exercises  - Prone Knee Flexion AROM  - 2 x daily - 7 x weekly - 3 sets - 10 reps  - Seated Knee Extension Stretch with Chair  - 2 x daily - 7 x weekly - 10 hold Access Code: [email or web address]  URL: [email or web address]  Date: 06/09/2025  Prepared by: Krystian Horton    Exercises  - Prone Knee Flexion AROM  - 2 x daily - 7 x weekly - 3 sets - 10 reps  - Seated Knee Extension Stretch with Chair  - 2 x daily - 7 x weekly - 10 hold  - Seated Long Arc Quad  - 1 x daily - 7 x weekly - 3 sets - 10 reps - 5 hold Access Code: [email or web address]  URL: [email or web address]/  Date: 06/12/2025  Prepared by: Krystian Horton    Exercises  -  Prone Knee Flexion AROM  - 2 x daily - 7 x weekly - 3 sets - 10 reps  - Seated Knee Extension Stretch with Chair  - 2 x daily - 7 x weekly - 10 hold  - Seated Long Arc Quad  - 1 x daily - 7 x weekly - 3 sets - 10 reps - 5 hold  - Single Leg Isometric Heel Raise at Wall  - 1 x daily - 7 x weekly - 3 sets - 10 reps        HEP  Access Code: [email or web address]  URL: [email or web address]  Date: 06/12/2025  Prepared by: Krystian Horton    Exercises  - Prone Knee Flexion AROM  - 2 x daily - 7 x weekly - 3 sets - 10 reps  - Seated Knee Extension Stretch with Chair  - 2 x daily - 7 x weekly - 10 hold  - Seated Long Arc Quad  - 1 x daily - 7 x weekly - 3 sets - 10 reps - 5 hold  - Single Leg Isometric Heel Raise at Wall  - 1 x daily - 7 x weekly - 3 sets - 10 reps    Charges     2TE, 1MT

## 2025-06-16 ENCOUNTER — OFFICE VISIT (OUTPATIENT)
Dept: PHYSICAL THERAPY | Age: 75
End: 2025-06-16
Attending: ORTHOPAEDIC SURGERY
Payer: MEDICARE

## 2025-06-16 PROCEDURE — 97140 MANUAL THERAPY 1/> REGIONS: CPT

## 2025-06-16 PROCEDURE — 97110 THERAPEUTIC EXERCISES: CPT

## 2025-06-16 NOTE — PROGRESS NOTES
Patient: Duran aHrry Jr. (74 year old, male) Referring Provider:  Insurance:   Diagnosis: Status post total knee replacement, left (Z96.652) [facility]  MEDICARE   Date of Surgery: 7/31/24 Next MD visit:  MARIANO JACOB INDEMNITY   Precautions:    No data recorded Referral Information:    Date of Evaluation: Req: 0, Auth: 0, Exp:     06/06/25 POC Auth Visits:          Today's Date   6/16/2025    Subjective  Patient reports 0-1/10 pain this day, reports pain hasn't necessarily been the issue as much as ROM and his shuffling.       Pain: 1/10     Objective         Assessment  Patient care this day focused largely on manual and PROM intervention to improve ROM of left knee, as well as hamstring strengthening and tissue mobility. Patient would benefit from continued efforts to strengthen hamstrings to reduce reccuring tension    Goals (to be met in 12 visits)   STG (to be met by 7/9/25)  -Patient will increase L knee ext AROM to -9 degrees to improve performance of activity including walking, bending, crouching, stair traversal, and picking up objects.     LTG (to be met by 8/6/25)  -Patient will show symmetrical step length and stance time during gait for improved mechanics and energy efficiency.  -Patient will increase L knee ext AROM to -2 degrees to improve performance of activity including walking, bending, crouching, stair traversal, and picking up objects.  -Patient will be able to perform a double leg squat to 90 deg knee flexion without lateral shift as evidence of improved movement capacity.  -Patient will be able to ascend and descend stairs reciprocally without significant pain or movement asymmetry to allow for normal household and community traversal.   -Patient will increase strength grade of all tested LE muscle groups to 5/5 to improve performance of activities requiring change of position or moving the body through space (position transfers, walking, running, jumping, stairs, etc).  -Patient will achieve  a score on the Lower Extremity Functional Scale of 70% as evidence of improved functional capacity during everyday activity.  -Patient will demonstrate and/or verbalize competence in performance of advanced HEP to be able to progress exercise on their own following discharge from physical therapy.     Plan  hamstring strengthening and tissue mobility focus    Treatment Last 4 Visits  Treatment Day: 4       6/6/2025 6/9/2025 6/12/2025 6/16/2025   LE Treatment   Therapeutic Exercise  -stationary bike L4 5min  -SAQ 4x10x8#  -LAQ 1x10x8#   -prone knee extension off EOT 4x10x8# -stationary bike L5 6 min  -prone HS curl [id number] stretch hold bottom (L) (20sec stretch rest between sets)  -TKE ball on wall (L knee) [id number]  -PWB single leg calf raise pumps [id number] on/off QS x 20 5\" H (L)  PROM Prone Knee Flexion, OP x 5 min  Prone HS Curls x 10; Yellow (L)  HS Mob with Ball x 20 (L)   Manual Therapy  -L heel prop knee ext overpressure stretch 15 min -manual knee ext overpressure (L) 15 min Patellar Distraction (L) x 6 min -with Sup/Inf Glides G2-3  STM (Supine): Posterior Knee (L) x 7 min   Cupping (Supine): Patellar Tendon, Quad Tendon x 5 min  Cupping (Supine): Distal Itband, Proximal Peroneals x 5 min    -manual knee ext overpressure (L) 6 min   Therapeutic Exercise Minutes 18 23 23 19   Manual Therapy Minutes  15 15 25   Evaluation Minutes 20      Total Time Of Timed Procedures 18 38 38 44   Total Time Of Service-Based Procedures 20 0 0 0   Total Treatment Time 38 38 38 44   HEP Access Code: [email or web address]  URL: [email or web address]  Date: 06/06/2025  Prepared by: Krystian Horton    Exercises  - Prone Knee Flexion AROM  - 2 x daily - 7 x weekly - 3 sets - 10 reps  - Seated Knee Extension Stretch with Chair  - 2 x daily - 7 x weekly - 10 hold Access Code: [email or web address]  URL: [email or web address]  Date: 06/09/2025  Prepared by: Krystian Horton    Exercises  - Prone Knee Flexion AROM  - 2 x  daily - 7 x weekly - 3 sets - 10 reps  - Seated Knee Extension Stretch with Chair  - 2 x daily - 7 x weekly - 10 hold  - Seated Long Arc Quad  - 1 x daily - 7 x weekly - 3 sets - 10 reps - 5 hold Access Code: [email or web address]  URL: [email or web address]  Date: 06/12/2025  Prepared by: Krystian Horton    Exercises  - Prone Knee Flexion AROM  - 2 x daily - 7 x weekly - 3 sets - 10 reps  - Seated Knee Extension Stretch with Chair  - 2 x daily - 7 x weekly - 10 hold  - Seated Long Arc Quad  - 1 x daily - 7 x weekly - 3 sets - 10 reps - 5 hold  - Single Leg Isometric Heel Raise at Wall  - 1 x daily - 7 x weekly - 3 sets - 10 reps Access Code: [email or web address]  URL: [email or web address]  Date: 06/12/2025  Prepared by: Krystian Horton     Exercises  - Prone Knee Flexion AROM  - 2 x daily - 7 x weekly - 3 sets - 10 reps  - Seated Knee Extension Stretch with Chair  - 2 x daily - 7 x weekly - 10 hold  - Seated Long Arc Quad  - 1 x daily - 7 x weekly - 3 sets - 10 reps - 5 hold  - Single Leg Isometric Heel Raise at Wall  - 1 x daily - 7 x weekly - 3 sets - 10 reps        HEP  Access Code: [email or web address]  URL: [email or web address]  Date: 06/12/2025  Prepared by: Krystian Horton     Exercises  - Prone Knee Flexion AROM  - 2 x daily - 7 x weekly - 3 sets - 10 reps  - Seated Knee Extension Stretch with Chair  - 2 x daily - 7 x weekly - 10 hold  - Seated Long Arc Quad  - 1 x daily - 7 x weekly - 3 sets - 10 reps - 5 hold  - Single Leg Isometric Heel Raise at Wall  - 1 x daily - 7 x weekly - 3 sets - 10 reps    Charges     Manual x 2, Ther Ex x 1

## 2025-06-18 ENCOUNTER — OFFICE VISIT (OUTPATIENT)
Dept: PHYSICAL THERAPY | Age: 75
End: 2025-06-18
Attending: ORTHOPAEDIC SURGERY
Payer: MEDICARE

## 2025-06-18 PROCEDURE — 97110 THERAPEUTIC EXERCISES: CPT

## 2025-06-18 PROCEDURE — 97140 MANUAL THERAPY 1/> REGIONS: CPT

## 2025-06-18 NOTE — PROGRESS NOTES
Patient: Duran Harry Jr. (74 year old, male) Referring Provider:  Insurance:   Diagnosis: Status post total knee replacement, left (Z96.652) [facility]  MEDICARE   Date of Surgery: 7/31/24 Next MD visit:  MARIANO JACOB INDEMNITY   Precautions:    No data recorded Referral Information:    Date of Evaluation: Req: 0, Auth: 0, Exp:     06/06/25 POC Auth Visits:          Today's Date   6/18/2025    Subjective  Patient reports more back pain today as he has been more active yesterday, has been wearing brace for holding his shoulders back and says this puts more stress on lower back.       Pain: 0/10     Objective    LEFS Score  LEFS Score: (Patient-Rptd) 78.75 % (6/6/2025  8:47 AM)       Assessment  Continued manual focus to improve tissue mobility and joint mobility of left knee, began rolling ITBand and dynamic cupping for lateral hip/knee for knee extension progression. Patient reported significant improvement in mobility by end of session, though patient still with patella resting slightly laterally compared to nonsurgical leg. LEFS to be taken next visit, attempted to complete over phone but no answer.     Goals (to be met in 12 visits)   STG (to be met by 7/9/25)  -Patient will increase L knee ext AROM to -9 degrees to improve performance of activity including walking, bending, crouching, stair traversal, and picking up objects.     LTG (to be met by 8/6/25)  -Patient will show symmetrical step length and stance time during gait for improved mechanics and energy efficiency.  -Patient will increase L knee ext AROM to -2 degrees to improve performance of activity including walking, bending, crouching, stair traversal, and picking up objects.  -Patient will be able to perform a double leg squat to 90 deg knee flexion without lateral shift as evidence of improved movement capacity.  -Patient will be able to ascend and descend stairs reciprocally without significant pain or movement asymmetry to allow for normal  household and community traversal.   -Patient will increase strength grade of all tested LE muscle groups to 5/5 to improve performance of activities requiring change of position or moving the body through space (position transfers, walking, running, jumping, stairs, etc).  -Patient will achieve a score on the Lower Extremity Functional Scale of 70% as evidence of improved functional capacity during everyday activity.  -Patient will demonstrate and/or verbalize competence in performance of advanced HEP to be able to progress exercise on their own following discharge from physical therapy.         Plan  Progress knee extension mobility as tolerated    Treatment Last 4 Visits  Treatment Day: 5 6/9/2025 6/12/2025 6/16/2025 6/18/2025   LE Treatment   Therapeutic Exercise -stationary bike L4 5min  -SAQ 4x10x8#  -LAQ 1x10x8#   -prone knee extension off EOT 4x10x8# -stationary bike L5 6 min  -prone HS curl [id number] stretch hold bottom (L) (20sec stretch rest between sets)  -TKE ball on wall (L knee) [id number]  -PWB single leg calf raise pumps [id number] on/off QS x 20 5\" H (L)  PROM Prone Knee Flexion, OP x 5 min  Prone HS Curls x 10; Yellow (L)  HS Mob with Ball x 20 (L) SLR with QS x 10 (L)  HS Mob with Ball x 20 (L)  Seated HS Stretch 2 x 30\" ea R/L   Lunging Calf Stretch 2 x 30\" ea R/L    Manual Therapy -L heel prop knee ext overpressure stretch 15 min -manual knee ext overpressure (L) 15 min Patellar Distraction (L) x 6 min -with Sup/Inf Glides G2-3  STM (Supine): Posterior Knee (L) x 7 min   Cupping (Supine): Patellar Tendon, Quad Tendon x 5 min  Cupping (Supine): Distal Itband, Proximal Peroneals x 5 min    -manual knee ext overpressure (L) 6 min Cupping (Static- Supine): Distal/Lateral Quads, ITBand (L)  Cupping (Silicone/Dynamic-Supine): Proximal ITBand, Peroneals   Patellar Distraction (L) x 6 min -with Sup/Inf Glides G2-3  Cupping (Silicone/Dynamic- Supine): Distal Itband, Proximal Peroneals x 5 min     Medial PFJ Mobs x 5 min (L)   ITBand Roll Out x 3 min (L)   Therapeutic Exercise Minutes 23 23 19 17   Manual Therapy Minutes 15 15 25 23   Total Time Of Timed Procedures 38 38 44 40   Total Time Of Service-Based Procedures 0 0 0 0   Total Treatment Time 38 38 44 40   HEP Access Code: [email or web address]  URL: [email or web address]/  Date: 06/09/2025  Prepared by: Krystian Bowendieen    Exercises  - Prone Knee Flexion AROM  - 2 x daily - 7 x weekly - 3 sets - 10 reps  - Seated Knee Extension Stretch with Chair  - 2 x daily - 7 x weekly - 10 hold  - Seated Long Arc Quad  - 1 x daily - 7 x weekly - 3 sets - 10 reps - 5 hold Access Code: [email or web address]  URL: [email or web address]/  Date: 06/12/2025  Prepared by: Krystian Bowendieen    Exercises  - Prone Knee Flexion AROM  - 2 x daily - 7 x weekly - 3 sets - 10 reps  - Seated Knee Extension Stretch with Chair  - 2 x daily - 7 x weekly - 10 hold  - Seated Long Arc Quad  - 1 x daily - 7 x weekly - 3 sets - 10 reps - 5 hold  - Single Leg Isometric Heel Raise at Wall  - 1 x daily - 7 x weekly - 3 sets - 10 reps Access Code: [email or web address]  URL: [email or web address]://[email or web address]/  Date: 06/12/2025  Prepared by: Krystian Bowendieen     Exercises  - Prone Knee Flexion AROM  - 2 x daily - 7 x weekly - 3 sets - 10 reps  - Seated Knee Extension Stretch with Chair  - 2 x daily - 7 x weekly - 10 hold  - Seated Long Arc Quad  - 1 x daily - 7 x weekly - 3 sets - 10 reps - 5 hold  - Single Leg Isometric Heel Raise at Wall  - 1 x daily - 7 x weekly - 3 sets - 10 reps Access Code: [email or web address]  URL: [email or web address]://[email or web address]/  Date: 06/12/2025  Prepared by: Krystian Boilesen     Exercises  - Prone Knee Flexion AROM  - 2 x daily - 7 x weekly - 3 sets - 10 reps  - Seated Knee Extension Stretch with Chair  - 2 x daily - 7 x weekly - 10 hold  - Seated Long Arc Quad  - 1 x daily - 7 x weekly - 3 sets - 10 reps - 5 hold  - Single Leg Isometric Heel Raise at Wall  - 1 x daily - 7 x  weekly - 3 sets - 10 reps        HEP  Access Code: [email or web address]  URL: [email or web address]  Date: 06/12/2025  Prepared by: Krystian Horton     Exercises  - Prone Knee Flexion AROM  - 2 x daily - 7 x weekly - 3 sets - 10 reps  - Seated Knee Extension Stretch with Chair  - 2 x daily - 7 x weekly - 10 hold  - Seated Long Arc Quad  - 1 x daily - 7 x weekly - 3 sets - 10 reps - 5 hold  - Single Leg Isometric Heel Raise at Wall  - 1 x daily - 7 x weekly - 3 sets - 10 reps    Charges     Manual x 2, Ther Ex x 1

## 2025-06-24 ENCOUNTER — OFFICE VISIT (OUTPATIENT)
Dept: PHYSICAL THERAPY | Age: 75
End: 2025-06-24
Attending: ORTHOPAEDIC SURGERY
Payer: MEDICARE

## 2025-06-24 PROCEDURE — 97110 THERAPEUTIC EXERCISES: CPT

## 2025-06-24 PROCEDURE — 97140 MANUAL THERAPY 1/> REGIONS: CPT

## 2025-06-24 NOTE — PROGRESS NOTES
Patient: Duran Harry Jr. (74 year old, male) Referring Provider:  Insurance:   Diagnosis: Status post total knee replacement, left (Z96.652) [facility]  MEDICARE   Date of Surgery: 7/31/24 Next MD visit:  MARIANO JACOB INDEMNITY   Precautions:    No data recorded Referral Information:    Date of Evaluation: Req: 0, Auth: 0, Exp:     06/06/25 POC Auth Visits:          Today's Date   6/24/2025    Subjective  Patient reports he did not do much HEP over weekend, he had a [place] to [place].       Pain: 1/10     Objective  end of sesson knee ext -4       See treatment below       Assessment  Patient able to achieve -4 knee ext w/ very aggressive manual therapy today. Encouraged continued HEP performance, mayra passive knee ext stretch w/ weighted hang          Interventions in today's session were performed with the goal of improving knee ext mobility. Clinical judgement and ongoing assessment were used to adjust intervention based on patient's response. Skilled PT guided patient through one-on-one treatment providing verbal, visual, tactile cues, guided exercise selection (type, volume, intensity, complexity), and appropriate rest breaks to maximize treatment effect. Treatment time includes time to complete interventions as well as time spent demonstrating proper technique and educating on effect/purpose of each intervention. The patient requires continued physical therapy treatment to achieve the current established goals. Answered all patient questions throughout session.    Goals (to be met in 12 visits)   STG (to be met by 7/9/25)  -Patient will increase L knee ext AROM to -9 degrees to improve performance of activity including walking, bending, crouching, stair traversal, and picking up objects.    LTG (to be met by 8/6/25)  -Patient will show symmetrical step length and stance time during gait for improved mechanics and energy efficiency.  -Patient will increase L knee ext AROM to -2 degrees to improve  performance of activity including walking, bending, crouching, stair traversal, and picking up objects.  -Patient will be able to perform a double leg squat to 90 deg knee flexion without lateral shift as evidence of improved movement capacity.  -Patient will be able to ascend and descend stairs reciprocally without significant pain or movement asymmetry to allow for normal household and community traversal.   -Patient will increase strength grade of all tested LE muscle groups to 5/5 to improve performance of activities requiring change of position or moving the body through space (position transfers, walking, running, jumping, stairs, etc).  -Patient will achieve a score on the Lower Extremity Functional Scale of 70% as evidence of improved functional capacity during everyday activity.  -Patient will demonstrate and/or verbalize competence in performance of advanced HEP to be able to progress exercise on their own following discharge from physical therapy.                   Plan  Progress knee extension mobility as tolerated    Treatment Last 4 Visits  Treatment Day: 6 6/12/2025 6/16/2025 6/18/2025 6/24/2025   LE Treatment   Therapeutic Exercise -stationary bike L5 6 min  -prone HS curl [id number] stretch hold bottom (L) (20sec stretch rest between sets)  -TKE ball on wall (L knee) [id number]  -PWB single leg calf raise pumps [id number] on/off QS x 20 5\" H (L)  PROM Prone Knee Flexion, OP x 5 min  Prone HS Curls x 10; Yellow (L)  HS Mob with Ball x 20 (L) SLR with QS x 10 (L)  HS Mob with Ball x 20 (L)  Seated HS Stretch 2 x 30\" ea R/L   Lunging Calf Stretch 2 x 30\" ea R/L  -stationary bike L5 5 min  -prone HS curl w/ ankle weight (off table edge, towel prop under knee); between full ext and 30 deg flex, 5 second holds 10 min w/ rest as needed (10# AW)  -prone HS curl manual relax contract relax 5/5sec in max knee ext x50   Manual Therapy -manual knee ext overpressure (L) 15 min Patellar Distraction (L) x 6  min -with Sup/Inf Glides G2-3  STM (Supine): Posterior Knee (L) x 7 min   Cupping (Supine): Patellar Tendon, Quad Tendon x 5 min  Cupping (Supine): Distal Itband, Proximal Peroneals x 5 min    -manual knee ext overpressure (L) 6 min Cupping (Static- Supine): Distal/Lateral Quads, ITBand (L)  Cupping (Silicone/Dynamic-Supine): Proximal ITBand, Peroneals   Patellar Distraction (L) x 6 min -with Sup/Inf Glides G2-3  Cupping (Silicone/Dynamic- Supine): Distal Itband, Proximal Peroneals x 5 min    Medial PFJ Mobs x 5 min (L)   ITBand Roll Out x 3 min (L) -STM L quad/HS/calf 5 min  -ant tibial mobilization grade IV 5 min  -manual overpressure knee ext stretch w/ heel prop 5 min   Therapeutic Exercise Minutes 23 19 17 23   Manual Therapy Minutes 15 25 23 15   Total Time Of Timed Procedures 38 44 40 38   Total Time Of Service-Based Procedures 0 0 0 0   Total Treatment Time 38 44 40 38   HEP Access Code: [email or web address]  URL: [email or web address]  Date: 06/12/2025  Prepared by: Krystian Horton    Exercises  - Prone Knee Flexion AROM  - 2 x daily - 7 x weekly - 3 sets - 10 reps  - Seated Knee Extension Stretch with Chair  - 2 x daily - 7 x weekly - 10 hold  - Seated Long Arc Quad  - 1 x daily - 7 x weekly - 3 sets - 10 reps - 5 hold  - Single Leg Isometric Heel Raise at Wall  - 1 x daily - 7 x weekly - 3 sets - 10 reps Access Code: [email or web address]  URL: [email or web address]  Date: 06/12/2025  Prepared by: Krystian Horton     Exercises  - Prone Knee Flexion AROM  - 2 x daily - 7 x weekly - 3 sets - 10 reps  - Seated Knee Extension Stretch with Chair  - 2 x daily - 7 x weekly - 10 hold  - Seated Long Arc Quad  - 1 x daily - 7 x weekly - 3 sets - 10 reps - 5 hold  - Single Leg Isometric Heel Raise at Wall  - 1 x daily - 7 x weekly - 3 sets - 10 reps Access Code: [email or web address]  URL: [email or web address]  Date: 06/12/2025  Prepared by: Krystian Mata  - Prone Knee Flexion AROM  - 2  x daily - 7 x weekly - 3 sets - 10 reps  - Seated Knee Extension Stretch with Chair  - 2 x daily - 7 x weekly - 10 hold  - Seated Long Arc Quad  - 1 x daily - 7 x weekly - 3 sets - 10 reps - 5 hold  - Single Leg Isometric Heel Raise at Wall  - 1 x daily - 7 x weekly - 3 sets - 10 reps         HEP  Access Code: [email or web address]  URL: [email or web address]  Date: 06/12/2025  Prepared by: Krystian Horton     Exercises  - Prone Knee Flexion AROM  - 2 x daily - 7 x weekly - 3 sets - 10 reps  - Seated Knee Extension Stretch with Chair  - 2 x daily - 7 x weekly - 10 hold  - Seated Long Arc Quad  - 1 x daily - 7 x weekly - 3 sets - 10 reps - 5 hold  - Single Leg Isometric Heel Raise at Wall  - 1 x daily - 7 x weekly - 3 sets - 10 reps    Charges     1MT, 2TE

## 2025-06-26 ENCOUNTER — OFFICE VISIT (OUTPATIENT)
Dept: PHYSICAL THERAPY | Age: 75
End: 2025-06-26
Attending: ORTHOPAEDIC SURGERY
Payer: MEDICARE

## 2025-06-26 PROCEDURE — 97110 THERAPEUTIC EXERCISES: CPT

## 2025-06-26 PROCEDURE — 97140 MANUAL THERAPY 1/> REGIONS: CPT

## 2025-06-26 NOTE — PROGRESS NOTES
Patient: Duran Harry Jr. (74 year old, male) Referring Provider:  Insurance:   Diagnosis: Status post total knee replacement, left (Z96.652) [facility]  MEDICARE   Date of Surgery: 7/31/24 Next MD visit:  [facility]AC[facility] [employer]   Precautions:    No data recorded Referral Information:    Date of Evaluation: Req: 0, Auth: 0, Exp:     06/06/25 POC Auth Visits:          Today's Date   6/26/2025    Subjective  Patient has no new major complaints.       Pain: 1/10     Objective  end of sesson knee ext -5              Assessment  Patient continues to have lack of TKE. It is evident that he is doing his HEP as he maintains improvement compared to evaluation. Will continue aggressive stretching in clinic. We discussed possible use of dynasplint at home today.        Interventions in today's session were performed with the goal of imprvoing knee ext mobility. Clinical judgement and ongoing assessment were used to adjust intervention based on patient's response. Skilled PT guided patient through one-on-one treatment providing verbal, visual, tactile cues, guided exercise selection (type, volume, intensity, complexity), and appropriate rest breaks to maximize treatment effect. Treatment time includes time to complete interventions as well as time spent demonstrating proper technique and educating on effect/purpose of each intervention. The patient requires continued physical therapy treatment to achieve the current established goals. Answered all patient questions throughout session.    Goals (to be met in 12 visits)   STG (to be met by 7/9/25)  -Patient will increase L knee ext AROM to -9 degrees to improve performance of activity including walking, bending, crouching, stair traversal, and picking up objects.    LTG (to be met by 8/6/25)  -Patient will show symmetrical step length and stance time during gait for improved mechanics and energy efficiency.  -Patient will increase L knee ext AROM to -2 degrees to improve  performance of activity including walking, bending, crouching, stair traversal, and picking up objects.  -Patient will be able to perform a double leg squat to 90 deg knee flexion without lateral shift as evidence of improved movement capacity.  -Patient will be able to ascend and descend stairs reciprocally without significant pain or movement asymmetry to allow for normal household and community traversal.   -Patient will increase strength grade of all tested LE muscle groups to 5/5 to improve performance of activities requiring change of position or moving the body through space (position transfers, walking, running, jumping, stairs, etc).  -Patient will achieve a score on the Lower Extremity Functional Scale of 70% as evidence of improved functional capacity during everyday activity.  -Patient will demonstrate and/or verbalize competence in performance of advanced HEP to be able to progress exercise on their own following discharge from physical therapy.                       Plan  Progress knee extension mobility as tolerated, aggressive manual overpressure for tissue creep.    Treatment Last 4 Visits  Treatment Day: 7       6/16/2025 6/18/2025 6/24/2025 6/26/2025   LE Treatment   Therapeutic Exercise QS x 20 5\" H (L)  PROM Prone Knee Flexion, OP x 5 min  Prone HS Curls x 10; Yellow (L)  HS Mob with Ball x 20 (L) SLR with QS x 10 (L)  HS Mob with Ball x 20 (L)  Seated HS Stretch 2 x 30\" ea R/L   Lunging Calf Stretch 2 x 30\" ea R/L  -stationary bike L5 5 min  -prone HS curl w/ ankle weight (off table edge, towel prop under knee); between full ext and 30 deg flex, 5 second holds 10 min w/ rest as needed (10# AW)  -prone HS curl manual relax contract relax 5/5sec in max knee ext x50 -stationary bike L4 5 min  -prone knee flex/ext 5# weight, 5 sec stretch bottom position 10 min resting as needed  -SAQ L over foam roller 3x10x5#     Manual Therapy Patellar Distraction (L) x 6 min -with Sup/Inf Glides G2-3  STM  (Supine): Posterior Knee (L) x 7 min   Cupping (Supine): Patellar Tendon, Quad Tendon x 5 min  Cupping (Supine): Distal Itband, Proximal Peroneals x 5 min    -manual knee ext overpressure (L) 6 min Cupping (Static- Supine): Distal/Lateral Quads, ITBand (L)  Cupping (Silicone/Dynamic-Supine): Proximal ITBand, Peroneals   Patellar Distraction (L) x 6 min -with Sup/Inf Glides G2-3  Cupping (Silicone/Dynamic- Supine): Distal Itband, Proximal Peroneals x 5 min    Medial PFJ Mobs x 5 min (L)   ITBand Roll Out x 3 min (L) -STM L quad/HS/calf 5 min  -ant tibial mobilization grade IV 5 min  -manual overpressure knee ext stretch w/ heel prop 5 min -manual overpressure knee ext 15 min   Therapeutic Exercise Minutes 19 17 23 23   Manual Therapy Minutes 25 23 15 15   Total Time Of Timed Procedures 44 40 38 38   Total Time Of Service-Based Procedures 0 0 0 0   Total Treatment Time 44 40 38 38   HEP Access Code: [email or web address]  URL: [email or web address]  Date: 06/12/2025  Prepared by: Krystian Horton     Exercises  - Prone Knee Flexion AROM  - 2 x daily - 7 x weekly - 3 sets - 10 reps  - Seated Knee Extension Stretch with Chair  - 2 x daily - 7 x weekly - 10 hold  - Seated Long Arc Quad  - 1 x daily - 7 x weekly - 3 sets - 10 reps - 5 hold  - Single Leg Isometric Heel Raise at Wall  - 1 x daily - 7 x weekly - 3 sets - 10 reps Access Code: [email or web address]  URL: [email or web address]  Date: 06/12/2025  Prepared by: Krystian Horton     Exercises  - Prone Knee Flexion AROM  - 2 x daily - 7 x weekly - 3 sets - 10 reps  - Seated Knee Extension Stretch with Chair  - 2 x daily - 7 x weekly - 10 hold  - Seated Long Arc Quad  - 1 x daily - 7 x weekly - 3 sets - 10 reps - 5 hold  - Single Leg Isometric Heel Raise at Wall  - 1 x daily - 7 x weekly - 3 sets - 10 reps          HEP  Access Code: [email or web address]  URL: [email or web address]  Date: 06/12/2025  Prepared by: Krystian Mata  - Prone Knee  Flexion AROM  - 2 x daily - 7 x weekly - 3 sets - 10 reps  - Seated Knee Extension Stretch with Chair  - 2 x daily - 7 x weekly - 10 hold  - Seated Long Arc Quad  - 1 x daily - 7 x weekly - 3 sets - 10 reps - 5 hold  - Single Leg Isometric Heel Raise at Wall  - 1 x daily - 7 x weekly - 3 sets - 10 reps    Charges     2TE, 1MT

## 2025-07-01 ENCOUNTER — OFFICE VISIT (OUTPATIENT)
Dept: PHYSICAL THERAPY | Age: 75
End: 2025-07-01
Attending: ORTHOPAEDIC SURGERY
Payer: MEDICARE

## 2025-07-01 PROCEDURE — 97110 THERAPEUTIC EXERCISES: CPT

## 2025-07-01 PROCEDURE — 97140 MANUAL THERAPY 1/> REGIONS: CPT

## 2025-07-01 NOTE — PROGRESS NOTES
Patient: Duran Harry Jr. (74 year old, male) Referring Provider:  Insurance:   Diagnosis: Status post total knee replacement, left (Z96.652) [facility]  MEDICARE   Date of Surgery: 7/31/24 Next MD visit:  MAR[facility] JACOB [employer]DEMNITY   Precautions:    No data recorded Referral Information:    Date of Evaluation: Req: 0, Auth: 0, Exp:     06/06/25 POC Auth Visits:          Today's Date   7/1/2025    Subjective  No major complaints, states he did his weight hanging on knee 2x yesterday.       Pain: 1/10     Objective  starting knee ext ROM -5; end of session knee ext -2              Assessment  Continue to aggressively pursue TKE ROM. Patient reports pain w/ aggressive manual therapy but feels knee motion/function is improved when leaving clinic.          Interventions in today's session were performed with the goal of knee mobility. Clinical judgement and ongoing assessment were used to adjust intervention based on patient's response. Skilled PT guided patient through one-on-one treatment providing verbal, visual, tactile cues, guided exercise selection (type, volume, intensity, complexity), and appropriate rest breaks to maximize treatment effect. Treatment time includes time to complete interventions as well as time spent demonstrating proper technique and educating on effect/purpose of each intervention. The patient requires continued physical therapy treatment to achieve the current established goals. Answered all patient questions throughout session.      Goals (to be met in 12 visits)   STG (to be met by 7/9/25)  -Patient will increase L knee ext AROM to -9 degrees to improve performance of activity including walking, bending, crouching, stair traversal, and picking up objects.    LTG (to be met by 8/6/25)  -Patient will show symmetrical step length and stance time during gait for improved mechanics and energy efficiency.  -Patient will increase L knee ext AROM to -2 degrees to improve performance of activity  including walking, bending, crouching, stair traversal, and picking up objects.  -Patient will be able to perform a double leg squat to 90 deg knee flexion without lateral shift as evidence of improved movement capacity.  -Patient will be able to ascend and descend stairs reciprocally without significant pain or movement asymmetry to allow for normal household and community traversal.   -Patient will increase strength grade of all tested LE muscle groups to 5/5 to improve performance of activities requiring change of position or moving the body through space (position transfers, walking, running, jumping, stairs, etc).  -Patient will achieve a score on the Lower Extremity Functional Scale of 70% as evidence of improved functional capacity during everyday activity.  -Patient will demonstrate and/or verbalize competence in performance of advanced HEP to be able to progress exercise on their own following discharge from physical therapy.                           Plan  Progress knee extension mobility as tolerated, aggressive manual overpressure for tissue creep.    Treatment Last 4 Visits  Treatment Day: 8       6/18/2025 6/24/2025 6/26/2025 7/1/2025   LE Treatment   Therapeutic Exercise SLR with QS x 10 (L)  HS Mob with Ball x 20 (L)  Seated HS Stretch 2 x 30\" ea R/L   Lunging Calf Stretch 2 x 30\" ea R/L  -stationary bike L5 5 min  -prone HS curl w/ ankle weight (off table edge, towel prop under knee); between full ext and 30 deg flex, 5 second holds 10 min w/ rest as needed (10# AW)  -prone HS curl manual relax contract relax 5/5sec in max knee ext x50 -stationary bike L4 5 min  -prone knee flex/ext 5# weight, 5 sec stretch bottom position 10 min resting as needed  -SAQ L over foam roller 3x10x5#   -stationary bike L5 5 min  -prone knee flexion pulses 5# ankle weight (L) 5 min   Manual Therapy Cupping (Static- Supine): Distal/Lateral Quads, ITBand (L)  Cupping (Silicone/Dynamic-Supine): Proximal ITBand, Peroneals    Patellar Distraction (L) x 6 min -with Sup/Inf Glides G2-3  Cupping (Silicone/Dynamic- Supine): Distal Itband, Proximal Peroneals x 5 min    Medial PFJ Mobs x 5 min (L)   ITBand Roll Out x 3 min (L) -STM L quad/HS/calf 5 min  -ant tibial mobilization grade IV 5 min  -manual overpressure knee ext stretch w/ heel prop 5 min -manual overpressure knee ext 15 min -aggressive overpressure knee ext stretching 15 min   Therapeutic Exercise Minutes 17 23 23 23   Manual Therapy Minutes 23 15 15 15   Total Time Of Timed Procedures 40 38 38 38   Total Time Of Service-Based Procedures 0 0 0 0   Total Treatment Time 40 38 38 38   HEP Access Code: [email or web address]  URL: [email or web address]  Date: 06/12/2025  Prepared by: Krystian Horton     Exercises  - Prone Knee Flexion AROM  - 2 x daily - 7 x weekly - 3 sets - 10 reps  - Seated Knee Extension Stretch with Chair  - 2 x daily - 7 x weekly - 10 hold  - Seated Long Arc Quad  - 1 x daily - 7 x weekly - 3 sets - 10 reps - 5 hold  - Single Leg Isometric Heel Raise at Wall  - 1 x daily - 7 x weekly - 3 sets - 10 reps           HEP  Access Code: [email or web address]  URL: [email or web address]  Date: 06/12/2025  Prepared by: Krystian Horton     Exercises  - Prone Knee Flexion AROM  - 2 x daily - 7 x weekly - 3 sets - 10 reps  - Seated Knee Extension Stretch with Chair  - 2 x daily - 7 x weekly - 10 hold  - Seated Long Arc Quad  - 1 x daily - 7 x weekly - 3 sets - 10 reps - 5 hold  - Single Leg Isometric Heel Raise at Wall  - 1 x daily - 7 x weekly - 3 sets - 10 reps    Charges     1MT, 2TE

## 2025-07-03 ENCOUNTER — OFFICE VISIT (OUTPATIENT)
Dept: PHYSICAL THERAPY | Age: 75
End: 2025-07-03
Attending: ORTHOPAEDIC SURGERY
Payer: MEDICARE

## 2025-07-03 PROCEDURE — 97530 THERAPEUTIC ACTIVITIES: CPT

## 2025-07-03 PROCEDURE — 97140 MANUAL THERAPY 1/> REGIONS: CPT

## 2025-07-03 PROCEDURE — 97110 THERAPEUTIC EXERCISES: CPT

## 2025-07-03 NOTE — PROGRESS NOTES
Patient: Duran Harry Jr. (74 year old, male) Referring Provider:  Insurance:   Diagnosis: Status post total knee replacement, left (Z96.652) [facility]  MEDICARE   Date of Surgery: 7/31/24 Next MD visit:  MAR[facility] JACOB INDEMNITY   Precautions:    No data recorded Referral Information:    Date of Evaluation: Req: 0, Auth: 0, Exp:     06/06/25 POC Auth Visits:          Today's Date   7/3/2025    Subjective  Patient reports he was a little stiff the next day after last visit, bu the does report he knee feels like it is able to stretch out a bit more when he's walking.       Pain: 1/10     Objective  end of session knee ext -4              Assessment  Utilized more exercise reinforcement after aggressive manual therapy today. Patient able to tolerate all intervention. Encouraged continued focus on knee ext when walking for repetitive mobility input thorughout the day.    Goals (to be met in 12 visits)   STG (to be met by 7/9/25)  -Patient will increase L knee ext AROM to -9 degrees to improve performance of activity including walking, bending, crouching, stair traversal, and picking up objects.    LTG (to be met by 8/6/25)  -Patient will show symmetrical step length and stance time during gait for improved mechanics and energy efficiency.  -Patient will increase L knee ext AROM to -2 degrees to improve performance of activity including walking, bending, crouching, stair traversal, and picking up objects.  -Patient will be able to perform a double leg squat to 90 deg knee flexion without lateral shift as evidence of improved movement capacity.  -Patient will be able to ascend and descend stairs reciprocally without significant pain or movement asymmetry to allow for normal household and community traversal.   -Patient will increase strength grade of all tested LE muscle groups to 5/5 to improve performance of activities requiring change of position or moving the body through space (position transfers, walking, running,  jumping, stairs, etc).  -Patient will achieve a score on the Lower Extremity Functional Scale of 70% as evidence of improved functional capacity during everyday activity.  -Patient will demonstrate and/or verbalize competence in performance of advanced HEP to be able to progress exercise on their own following discharge from physical therapy.                               Plan  Progress knee extension mobility as tolerated, aggressive manual overpressure for tissue creep.    Treatment Last 4 Visits  Treatment Day: 9       6/24/2025 6/26/2025 7/1/2025 7/3/2025   LE Treatment   Therapeutic Exercise -stationary bike L5 5 min  -prone HS curl w/ ankle weight (off table edge, towel prop under knee); between full ext and 30 deg flex, 5 second holds 10 min w/ rest as needed (10# AW)  -prone HS curl manual relax contract relax 5/5sec in max knee ext x50 -stationary bike L4 5 min  -prone knee flex/ext 5# weight, 5 sec stretch bottom position 10 min resting as needed  -SAQ L over foam roller 3x10x5#   -stationary bike L5 5 min  -prone knee flexion pulses 5# ankle weight (L) 5 min -stationary bike L5 5 min  -prone HS curl pulses 5# AW, 5 min     Manual Therapy -STM L quad/HS/calf 5 min  -ant tibial mobilization grade IV 5 min  -manual overpressure knee ext stretch w/ heel prop 5 min -manual overpressure knee ext 15 min -aggressive overpressure knee ext stretching 15 min -aggressive knee ext stretching 15 min   Therapeutic Activity    -cable stack fwd/bwd weight shift w/ L ft fwd, focusing on quad contraction [id number]#  -single leg bent to straight knee calf raise, emphasis on knee ext, L hand on wall for balance, holding weight in R hand [id number]#   Therapeutic Exercise Minutes 23 23 23 10   Manual Therapy Minutes 15 15 15 15   Therapeutic Activity Minutes    13   Total Time Of Timed Procedures 38 38 38 38   Total Time Of Service-Based Procedures 0 0 0 0   Total Treatment Time 38 38 38 38        HEP  Access Code: [email or web address]  URL:  [email or web address]  Date: 06/12/2025  Prepared by: Krystian Horton     Exercises  - Prone Knee Flexion AROM  - 2 x daily - 7 x weekly - 3 sets - 10 reps  - Seated Knee Extension Stretch with Chair  - 2 x daily - 7 x weekly - 10 hold  - Seated Long Arc Quad  - 1 x daily - 7 x weekly - 3 sets - 10 reps - 5 hold  - Single Leg Isometric Heel Raise at Wall  - 1 x daily - 7 x weekly - 3 sets - 10 reps    Charges     1MT, 1TE, 1TA

## 2025-07-07 ENCOUNTER — OFFICE VISIT (OUTPATIENT)
Dept: PHYSICAL THERAPY | Age: 75
End: 2025-07-07
Attending: ORTHOPAEDIC SURGERY
Payer: MEDICARE

## 2025-07-07 PROCEDURE — 97110 THERAPEUTIC EXERCISES: CPT

## 2025-07-07 PROCEDURE — 97140 MANUAL THERAPY 1/> REGIONS: CPT

## 2025-07-07 NOTE — PROGRESS NOTES
Patient: Duran Harry Jr. (74 year old, male) Referring Provider:  Insurance:   Diagnosis: Status post total knee replacement, left (Z96.652) [facility]  MEDICARE   Date of Surgery: 7/31/24 Next MD visit:  MAR[facility] JACOB INDEMNITY   Precautions:    No data recorded Referral Information:    Date of Evaluation: Req: 0, Auth: 0, Exp:     06/06/25 POC Auth Visits:          Today's Date   7/7/2025    Subjective  Patient reports he has been able to better extend his leg when walking and pushing off       Pain: 0/10     Objective         Assessment  Limitations of left knee extension that remain  seem to be more lower leg related, patient care this day focused on mainly on cupping, stretching, and self mobilization for left calf this day.    Goals (to be met in 12 visits)   STG (to be met by 7/9/25)  -Patient will increase L knee ext AROM to -9 degrees to improve performance of activity including walking, bending, crouching, stair traversal, and picking up objects.     LTG (to be met by 8/6/25)  -Patient will show symmetrical step length and stance time during gait for improved mechanics and energy efficiency.  -Patient will increase L knee ext AROM to -2 degrees to improve performance of activity including walking, bending, crouching, stair traversal, and picking up objects.  -Patient will be able to perform a double leg squat to 90 deg knee flexion without lateral shift as evidence of improved movement capacity.  -Patient will be able to ascend and descend stairs reciprocally without significant pain or movement asymmetry to allow for normal household and community traversal.   -Patient will increase strength grade of all tested LE muscle groups to 5/5 to improve performance of activities requiring change of position or moving the body through space (position transfers, walking, running, jumping, stairs, etc).  -Patient will achieve a score on the Lower Extremity Functional Scale of 70% as evidence of improved functional  capacity during everyday activity.  -Patient will demonstrate and/or verbalize competence in performance of advanced HEP to be able to progress exercise on their own following discharge from physical therapy.             Plan  Consider updated HEP with focus on calf stretching, strengthening, and self mobilization    Treatment Last 4 Visits  Treatment Day: 10       6/26/2025 7/1/2025 7/3/2025 7/7/2025   LE Treatment   Therapeutic Exercise -stationary bike L4 5 min  -prone knee flex/ext 5# weight, 5 sec stretch bottom position 10 min resting as needed  -SAQ L over foam roller 3x10x5#   -stationary bike L5 5 min  -prone knee flexion pulses 5# ankle weight (L) 5 min -stationary bike L5 5 min  -prone HS curl pulses 5# AW, 5 min   Manual HS/Calf Stretch with OP in Prone x 6 min   QS with Towel Roll under Ankle 2 x 10 5\" H (L)   Slant Board Calf Stretch 2 x 30\" ea R/L   DLHR x 10  Lunging Calf Stretch 2 x 30\" ea R/L   Calf Mob with Ball x 20 (L)     Manual Therapy -manual overpressure knee ext 15 min -aggressive overpressure knee ext stretching 15 min -aggressive knee ext stretching 15 min STM/Cupping (Prone): Distal ITBand, Lateral HS, Proximal Gastroc, Peroneals (L) x 15 min        Therapeutic Activity   -cable stack fwd/bwd weight shift w/ L ft fwd, focusing on quad contraction [id number]#  -single leg bent to straight knee calf raise, emphasis on knee ext, L hand on wall for balance, holding weight in R hand [id number]#    Therapeutic Exercise Minutes 23 23 10 17   Manual Therapy Minutes 15 15 15 23   Therapeutic Activity Minutes   13    Total Time Of Timed Procedures 38 38 38 40   Total Time Of Service-Based Procedures 0 0 0 0   Total Treatment Time 38 38 38 40   HEP    Access Code: [email or web address]  URL: [email or web address]  Date: 06/12/2025  Prepared by: Krystian Horton     Exercises  - Prone Knee Flexion AROM  - 2 x daily - 7 x weekly - 3 sets - 10 reps  - Seated Knee Extension Stretch with Chair  - 2 x daily  - 7 x weekly - 10 hold  - Seated Long Arc Quad  - 1 x daily - 7 x weekly - 3 sets - 10 reps - 5 hold  - Single Leg Isometric Heel Raise at Wall  - 1 x daily - 7 x weekly - 3 sets - 10 reps        HEP  Access Code: [email or web address]  URL: [email or web address]  Date: 06/12/2025  Prepared by: Krystian Horton     Exercises  - Prone Knee Flexion AROM  - 2 x daily - 7 x weekly - 3 sets - 10 reps  - Seated Knee Extension Stretch with Chair  - 2 x daily - 7 x weekly - 10 hold  - Seated Long Arc Quad  - 1 x daily - 7 x weekly - 3 sets - 10 reps - 5 hold  - Single Leg Isometric Heel Raise at Wall  - 1 x daily - 7 x weekly - 3 sets - 10 reps    Charges     Manual x 2, Ther Ex x 1

## 2025-07-09 ENCOUNTER — OFFICE VISIT (OUTPATIENT)
Dept: PHYSICAL THERAPY | Age: 75
End: 2025-07-09
Attending: ORTHOPAEDIC SURGERY
Payer: MEDICARE

## 2025-07-09 PROCEDURE — 97140 MANUAL THERAPY 1/> REGIONS: CPT

## 2025-07-09 PROCEDURE — 97110 THERAPEUTIC EXERCISES: CPT

## 2025-07-09 NOTE — PROGRESS NOTES
Patient: Duran Harry Jr. (74 year old, male) Referring Provider:  Insurance:   Diagnosis: Status post total knee replacement, left (Z96.652) [facility]  MEDICARE   Date of Surgery: 7/31/24 Next MD visit:  [facility] [employer]   Precautions:    No data recorded Referral Information:    Date of Evaluation: Req: 0, Auth: 0, Exp:     06/06/25 POC Auth Visits:         Discharge Summary  Pt has attended 11 visits in Physical Therapy    Today's Date   7/9/2025    Subjective  Patient has remained consistent w/ HEP at home, but he knows if he lets his knee sit still for a while it \"tightens back up again\". He is prepared to d/c today, plans to continue HEP independently.       Pain: 0/10     Objective  start of session knee ext -8, end of session -3       Knee ROM   L - Active   Extension -8   Flexion 134       Lower Extremity Strength    LEFT RIGHT   Hip Flexion 5 5   Hip Extension 5 5   Hip ABD 5 5   Hip ADD 5 5   Hip IR @ 90 deg flexion 5 5   Hip ER @ 90 deg flexion 5 5   Knee Extension 5 5   Knee Flexion 5 5     -gait quality: continues to demonstrate mild gait asymmetry due to knee ext difference between sides  -stairs: ascends and descends reciprocally           Assessment  Patient demonstrates improved L knee ext mobility at rest prior to intervention. Total difference between eval measurement and today is ~10 deg. He has reached w/in 3 deg of full extension w/ L knee after intervention on multiple occasions. Patient reports no other significant functional limitations. He states understanding of necessity to continue aggressive and frequent stretching of L knee at home. Answered all patient questions.    Goals (to be met in 12 visits)   STG  -Patient will increase L knee ext AROM to -9 degrees to improve performance of activity including walking, bending, crouching, stair traversal, and picking up objects. (MET 7/9/25)    LTG (to be met by 8/6/25)  -Patient will show symmetrical step length and stance time during  gait for improved mechanics and energy efficiency.  -Patient will increase L knee ext AROM to -2 degrees to improve performance of activity including walking, bending, crouching, stair traversal, and picking up objects.  -Patient will be able to perform a double leg squat to 90 deg knee flexion without lateral shift as evidence of improved movement capacity. (MET 7/9/25)  -Patient will be able to ascend and descend stairs reciprocally without significant pain or movement asymmetry to allow for normal household and community traversal.  (MET 7/9/25)  -Patient will increase strength grade of all tested LE muscle groups to 5/5 to improve performance of activities requiring change of position or moving the body through space (position transfers, walking, running, jumping, stairs, etc). (MET 7/9/25)  -Patient will achieve a score on the Lower Extremity Functional Scale of 70% as evidence of improved functional capacity during everyday activity. (MET 7/9/25)  -Patient will demonstrate and/or verbalize competence in performance of advanced HEP to be able to progress exercise on their own following discharge from physical therapy. (MET 7/9/25)    LEFS Score  LEFS Score: (Patient-Rptd) 78.75 % (6/6/2025  8:47 AM)    Post LEFS Score  Post LEFS Score: (Patient-Rptd) 81.25 % (7/9/2025 10:42 AM)    2.5 % improvement    Plan: discharge to [facility]    Patient/Family/Caregiver was advised of these findings, precautions, and treatment options and has agreed to actively participate in planning and for this course of care.    Thank you for your referral. If you have any questions, please contact me at Dept: [phone].    Sincerely,  Electronically signed by therapist: Krystian Horton PT     Physician's certification required:  No  Please co-sign or sign and return this letter via fax as soon as possible to [phone].   I certify the need for these services furnished under this plan of treatment and while under my  care.    X___________________________________________________ Date____________________    Certification From: 7/9/2025  To:10/7/2025          Treatment Last 4 Visits  Treatment Day: 11       7/1/2025 7/3/2025 7/7/2025 7/9/2025   LE Treatment   Therapeutic Exercise -stationary bike L5 5 min  -prone knee flexion pulses 5# ankle weight (L) 5 min -stationary bike L5 5 min  -prone HS curl pulses 5# AW, 5 min   Manual HS/Calf Stretch with OP in Prone x 6 min   QS with Towel Roll under Ankle 2 x 10 5\" H (L)   Slant Board Calf Stretch 2 x 30\" ea R/L   DLHR x 10  Lunging Calf Stretch 2 x 30\" ea R/L   Calf Mob with Ball x 20 (L)   *discharge assessment  -stationary bike L5 5 min  -prone knee flexion to full ext 5# AW, 1x40  -supine SAQ 1x30x5#   Manual Therapy -aggressive overpressure knee ext stretching 15 min -aggressive knee ext stretching 15 min STM/Cupping (Prone): Distal ITBand, Lateral HS, Proximal Gastroc, Peroneals (L) x 15 min      -ant/post tib glide NWB grade IV 5 min total  -aggressive knee ext overpressure w/ heel prop 10 min total   Therapeutic Activity  -cable stack fwd/bwd weight shift w/ L ft fwd, focusing on quad contraction [id number]  -single leg bent to straight knee calf raise, emphasis on knee ext, L hand on wall for balance, holding weight in R hand [id number]     Therapeutic Exercise Minutes 23 10 17 23   Manual Therapy Minutes 15 15 23 15   Therapeutic Activity Minutes  13     Total Time Of Timed Procedures 38 38 40 38   Total Time Of Service-Based Procedures 0 0 0 0   Total Treatment Time 38 38 40 38   HEP   Access Code: [email or web address]  URL: [email or web address]  Date: 06/12/2025  Prepared by: Krystian Horton     Exercises  - Prone Knee Flexion AROM  - 2 x daily - 7 x weekly - 3 sets - 10 reps  - Seated Knee Extension Stretch with Chair  - 2 x daily - 7 x weekly - 10 hold  - Seated Long Arc Quad  - 1 x daily - 7 x weekly - 3 sets - 10 reps - 5 hold  - Single Leg Isometric Heel Raise at Wall   - 1 x daily - 7 x weekly - 3 sets - 10 reps         HEP  Access Code: [email or web address]  URL: [email or web address]  Date: 06/12/2025  Prepared by: Krystian Horton     Exercises  - Prone Knee Flexion AROM  - 2 x daily - 7 x weekly - 3 sets - 10 reps  - Seated Knee Extension Stretch with Chair  - 2 x daily - 7 x weekly - 10 hold  - Seated Long Arc Quad  - 1 x daily - 7 x weekly - 3 sets - 10 reps - 5 hold  - Single Leg Isometric Heel Raise at Wall  - 1 x daily - 7 x weekly - 3 sets - 10 reps    Charges     2TE, 1MT

## 2025-07-21 RX ORDER — PANTOPRAZOLE SODIUM 40 MG/1
40 TABLET, DELAYED RELEASE ORAL DAILY PRN
Qty: 90 TABLET | Refills: 1 | Status: SHIPPED | OUTPATIENT
Start: 2025-07-21

## 2025-07-21 NOTE — TELEPHONE ENCOUNTER
Gastrointestional Medication Protocol Passed07/18/2025 08:28 AM   Protocol Details In person appointment or virtual visit in the past 12 mos or appointment in next 3 mos    Medication is active on med list

## (undated) DEVICE — HOOD: Brand: FLYTE

## (undated) DEVICE — GLOVE SUR 8 SENSICARE PI PIP CRM PWD F

## (undated) DEVICE — Device: Brand: STABLECUT®

## (undated) DEVICE — ANTIBACTERIAL UNDYED BRAIDED (POLYGLACTIN 910), SYNTHETIC ABSORBABLE SUTURE: Brand: COATED VICRYL

## (undated) DEVICE — SUT VCRL + 1 27IN ABSRB UD OS-6 L36MM

## (undated) DEVICE — DRAPE,U/SHT,SPLIT,FILM,60X84,STERILE: Brand: MEDLINE

## (undated) DEVICE — SLEEVE COMPR MD KNEE LEN SGL USE KENDALL SCD

## (undated) DEVICE — SOLUTION IRRIG 3000ML 0.9% NACL FLX CONT

## (undated) DEVICE — COVER LT HNDL RIG FOR SUR CAM DISP

## (undated) DEVICE — GOWN SUR 2XL LEV 4 BLU W/ WHT V NK BND AERO

## (undated) DEVICE — STOCKINETTE,IMPERVIOUS,12X48,STERILE: Brand: MEDLINE

## (undated) DEVICE — 2T11 #2 PDO 36 X 36: Brand: 2T11 #2 PDO 36 X 36

## (undated) DEVICE — GLOVE SUR 8.5 SENSICARE PI PIP GRN PWD F

## (undated) DEVICE — SYRINGE MED 30ML STD CLR PLAS LL TIP N CTRL

## (undated) DEVICE — HOOD, PEEL-AWAY: Brand: FLYTE

## (undated) DEVICE — DISPOSABLE TOURNIQUET CUFF SINGLE BLADDER, DUAL PORT AND QUICK CONNECT CONNECTOR: Brand: COLOR CUFF

## (undated) DEVICE — NEEDLE SPNL 18GA L3.5IN PNK QNCKE STYL DISP

## (undated) DEVICE — BOWL BNE CEM MIX DISP QUIK-VAC

## (undated) DEVICE — BNDG,ELSTC,MATRIX,STRL,4"X5YD,LF,HOOK&LP: Brand: MEDLINE

## (undated) DEVICE — SIGMA LCS HIGH PERFORMANCE STERILE THREADED HEADED PINS: Brand: SIGMA LCS HIGH PERFORMANCE

## (undated) DEVICE — GLOVE SUR 8.5 SENSICARE PI PIP CRM PWD F

## (undated) DEVICE — BIPOLAR SEALER 23-112-1 AQM 6.0: Brand: AQUAMANTYS™

## (undated) DEVICE — 450 ML BOTTLE OF 0.05% CHLORHEXIDINE GLUCONATE IN 99.95% STERILE WATER FOR IRRIGATION, USP AND APPLICATOR.: Brand: IRRISEPT ANTIMICROBIAL WOUND LAVAGE

## (undated) DEVICE — SIGMA LCS HIGH PERFORMANCE INSTRUMENTS STERILE THREADED PINS: Brand: SIGMA LCS HIGH PERFORMANCE

## (undated) DEVICE — TOTAL KNEE CDS: Brand: MEDLINE INDUSTRIES, INC.

## (undated) DEVICE — UNIVERSAL STERIBUMP® STERILE (5/CASE): Brand: UNIVERSAL STERIBUMP®

## (undated) DEVICE — WRAP COMPR UNIV KNEE HOT CLD GEL MICWV AND

## (undated) DEVICE — PENCIL ES BTTN SWCH W/ TIP HOLSTER E-Z CLN

## (undated) NOTE — LETTER
OUTSIDE TESTING RESULT REQUEST     IMPORTANT: FOR YOUR IMMEDIATE ATTENTION  Please FAX all test results listed below to: [phone]     Testing already done on or about: 2024     * * * * If testing is NOT complete, arrange with patient A.S.A.P. * * * *      Patient Name: Duran Harry Jr.  Surgery Date: 2024  Medical Record: [id number]  CSN: [id number]  : 1950 - A: 73 y     Sex: male  Surgeon(s):  Igor Ferrer MD  Procedure: LEFT TOTAL KNEE ARTHROPLASTY  Anesthesia Type: Choice     Surgeon: Igor Ferrer MD     The following Testing and Time Line are REQUIRED PER ANESTHESIA     Type and Screen for Pre-Admission Testing (must be within 28 days of surgery)      Thank You,   Sent by:ALBA HERRERA